# Patient Record
Sex: FEMALE | Race: WHITE | Employment: FULL TIME | URBAN - METROPOLITAN AREA
[De-identification: names, ages, dates, MRNs, and addresses within clinical notes are randomized per-mention and may not be internally consistent; named-entity substitution may affect disease eponyms.]

---

## 2018-06-27 ENCOUNTER — OFFICE VISIT (OUTPATIENT)
Dept: OPHTHALMOLOGY | Facility: CLINIC | Age: 33
End: 2018-06-27
Payer: COMMERCIAL

## 2018-06-27 DIAGNOSIS — H10.13 ALLERGIC CONJUNCTIVITIS OF BOTH EYES: Primary | ICD-10-CM

## 2018-06-27 DIAGNOSIS — H52.13 MYOPIA OF BOTH EYES: ICD-10-CM

## 2018-06-27 RX ORDER — ACETAMINOPHEN 160 MG
2000 TABLET,DISINTEGRATING ORAL
COMMUNITY
Start: 2018-06-12

## 2018-06-27 RX ORDER — ONDANSETRON 4 MG/1
8 TABLET, ORALLY DISINTEGRATING ORAL
COMMUNITY
Start: 2018-06-12 | End: 2024-07-09

## 2018-06-27 RX ORDER — BUPROPION HYDROCHLORIDE 150 MG/1
300 TABLET ORAL
COMMUNITY
Start: 2018-06-12 | End: 2024-07-09

## 2018-06-27 RX ORDER — ISOTRETINOIN 30 MG/1
CAPSULE, LIQUID FILLED ORAL
Refills: 0 | COMMUNITY
Start: 2018-06-06 | End: 2024-07-09

## 2018-06-27 RX ORDER — VALACYCLOVIR HYDROCHLORIDE 1 G/1
1000 TABLET, FILM COATED ORAL
COMMUNITY
Start: 2018-06-12 | End: 2024-07-09

## 2018-06-27 RX ORDER — OLOPATADINE HYDROCHLORIDE 1 MG/ML
1 SOLUTION/ DROPS OPHTHALMIC 2 TIMES DAILY
Qty: 1 BOTTLE | Refills: 3 | Status: SHIPPED | OUTPATIENT
Start: 2018-06-27 | End: 2024-07-09

## 2018-06-27 ASSESSMENT — REFRACTION_CURRENTRX
OS_BASECURVE: 8.5
OS_AXIS: 180
OD_SPHERE: -2.50
OD_AXIS: 180
OD_DIAMETER: 14.5
OD_CYLINDER: -1.75
OS_DIAMETER: 14.5
OS_SPHERE: -2.25
OD_BASECURVE: 8.5
OS_CYLINDER: -1.75

## 2018-06-27 ASSESSMENT — REFRACTION_MANIFEST
OS_SPHERE: -4.00
OD_SPHERE: -4.25
OS_CYLINDER: +2.00
OD_CYLINDER: +2.00
OD_AXIS: 090
OS_AXIS: 100

## 2018-06-27 ASSESSMENT — VISUAL ACUITY
CORRECTION_TYPE: GLASSES
METHOD: SNELLEN - LINEAR
OS_CC: 20/20
OD_CC: 20/20

## 2018-06-27 ASSESSMENT — CONF VISUAL FIELD
OS_NORMAL: 1
OD_NORMAL: 1

## 2018-06-27 ASSESSMENT — CUP TO DISC RATIO
OD_RATIO: 0.4
OS_RATIO: 0.3

## 2018-06-27 ASSESSMENT — REFRACTION_WEARINGRX
OS_AXIS: 100
OS_CYLINDER: +2.00
OD_SPHERE: -4.25
SPECS_TYPE: SVL
OD_AXIS: 092
OD_CYLINDER: +2.00
OS_SPHERE: -4.25

## 2018-06-27 ASSESSMENT — SLIT LAMP EXAM - LIDS
COMMENTS: NORMAL
COMMENTS: NORMAL

## 2018-06-27 ASSESSMENT — TONOMETRY
IOP_METHOD: TONOPEN
OS_IOP_MMHG: 17
OD_IOP_MMHG: 16

## 2018-06-27 ASSESSMENT — EXTERNAL EXAM - LEFT EYE: OS_EXAM: NORMAL

## 2018-06-27 ASSESSMENT — EXTERNAL EXAM - RIGHT EYE: OD_EXAM: NORMAL

## 2018-06-27 NOTE — PROGRESS NOTES
Assessment/Plan  (H10.13) Allergic conjunctivitis of both eyes  (primary encounter diagnosis)  Comment: Symptomatic  Plan: olopatadine (PATANOL) 0.1 % ophthalmic solution         Educated patient on clinical findings. Prescribed Patanol bid OU x 1 month. Monitor at contact lens dispense visit.   The patient is taking Accutane, which may be causing dryness. If symptoms not improved after treating allergies, consider further treatment.    (H52.13) Myopia of both eyes  Comment: Myopic astigmatism OU  Plan: HC CONTACT LENS FITTING COSMETIC LVL 1 (16937.011), REFRACTION [04803]         Educated patient on condition and clinical findings. Dispensed spectacle prescription for full time wear. Educated patient on possibility of adaptation period, if symptoms do not improve return to clinic for further testing.   Current contact lens vision and fit is acceptable, but given infrequent wear, daily lenses are more practical. 2 brands ordered. Patient to be scheduled for dispense visit upon receipt of lenses.    Contact Lens Billing  V-Code:  - Soft toric     CL Fitting Fee: $75    These are for cosmetic contact lenses.    Encounter Diagnoses   Name Primary?     Allergic conjunctivitis of both eyes Yes     Myopia of both eyes           Complete documentation of historical and exam elements from today's encounter can  be found in the full encounter summary report (not reduplicated in this progress  note). I personally obtained the chief complaint(s) and history of present illness. I  confirmed and edited as necessary the review of systems, past medical/surgical  history, family history, social history, and examination findings as documented by  others; and I examined the patient myself. I personally reviewed the relevant tests,  images, and reports as documented above. I formulated and edited as necessary the  assessment and plan and discussed the findings and management plan with the  patient and family.    Denny Whelan,  OD, FAAO

## 2018-06-27 NOTE — NURSING NOTE
Chief Complaints and History of Present Illnesses   Patient presents with     COMPREHENSIVE EYE EXAM     HPI    Affected eye(s):  Both   Symptoms:     No blurred vision   No floaters   No flashes   No foreign body sensation   No tearing   Dryness (Comment: patient denies eye gtts for relief)   No itching   No burning         Do you have eye pain now?:  Yes   Location:  OD   Pain Level:  No Pain (1)   Pain Duration:  2 weeks   Pain Frequency:  Intermittent   Pain Characteristics:  Aching      Comments:    Patient is here today for CTL exam today and gls, notes she has been having an achey/pressure feeling in the RE intermittently for about 2-3 weeks now    Tiara Bonilla June 27, 2018 1:11 PM

## 2018-06-27 NOTE — MR AVS SNAPSHOT
After Visit Summary   6/27/2018    Kemi Mckoy    MRN: [id number]           Patient Information     Date Of Birth          1985        Visit Information        Provider Department      6/27/2018 1:00 PM Denny Whelan OD  [facility]        Today's Diagnoses     Allergic conjunctivitis of both eyes    -  1    Myopia of both eyes           Follow-ups after your visit        Who to contact     Please call your clinic at [phone] to:    Ask questions about your health    Make or cancel appointments    Discuss your medicines    Learn about your test results    Speak to your doctor            Additional Information About Your Visit        Care EveryWhere ID     This is your Care EveryWhere ID. This could be used by other organizations to access your [place] medical records  [id number]         Blood Pressure from Last 3 Encounters:   No data found for BP    Weight from Last 3 Encounters:   No data found for Wt              We Performed the Following     HC CONTACT LENS FITTING COSMETIC LVL 1 (63849.011)     REFRACTION [54619]          Today's Medication Changes          These changes are accurate as of 6/27/18 11:59 PM.  If you have any questions, ask your nurse or doctor.               Start taking these medicines.        Dose/Directions    olopatadine 0.1 % ophthalmic solution   Commonly known as:  PATANOL   Used for:  Allergic conjunctivitis of both eyes   Started by:  Denny Whelan, ALIA        Dose:  1 drop   Place 1 drop into both eyes 2 times daily   Quantity:  1 Bottle   Refills:  3            Where to get your medicines      These medications were sent to [facility] [address] [address] [address] AT [facility] AND [address] [address] [address] [facility] [address]     Phone:  [phone]     olopatadine 0.1 % ophthalmic solution                Primary Care Provider Fax #    [facility] [phone]       [address] [place] [address]  [facility] [address]        Equal Access to Services     [facility] : Hadii aad ku hadjagdishlou Geraldoali, watomasda luqadaha, qashardata kaalmamaury tripp, richard reyes. So [facility] [phone].    ATENCIÓN: Si habla español, tiene a cadena disposición servicios gratuitos de asistencia lingüística. Fawname al [phone].    We comply with applicable federal civil rights laws and [place] laws. We do not discriminate on the basis of race, color, national origin, age, disability, sex, sexual orientation, or gender identity.            Thank you!     Thank you for choosing [facility] OPHTHALMOLOGY  for your care. Our goal is always to provide you with excellent care. Hearing back from our patients is one way we can continue to improve our services. Please take a few minutes to complete the written survey that you may receive in the mail after your visit with us. Thank you!             Your Updated Medication List - Protect others around you: Learn how to safely use, store and throw away your medicines at www.disposemymeds.org.          This list is accurate as of 6/27/18 11:59 PM.  Always use your most recent med list.                   Brand Name Dispense Instructions for use Diagnosis    ampicillin 500 MG capsule    PRINCIPEN    60 capsule    One h.s. And one in AM for acne    Acne       buPROPion 150 MG 24 hr tablet    WELLBUTRIN XL     Take 300 mg by mouth        CLARAVIS 30 MG Caps   Generic drug:  ISOtretinoin      TK 2 CS PO QD WITH HIGH FAT MEAL        doxycycline 100 MG capsule    VIBRAMYCIN     Take by mouth 2 times daily        * levonorgestrel 20 MCG/24HR IUD    MIRENA     1 each by Intrauterine route once        * levonorgestrel 20 MCG/24HR IUD    MIRENA          olopatadine 0.1 % ophthalmic solution    PATANOL    1 Bottle    Place 1 drop into both eyes 2 times daily    Allergic conjunctivitis of both eyes       ondansetron 4 MG ODT tab    ZOFRAN-ODT     Take 8 mg by mouth        tretinoin  (emollient) 0.05 % Crea cream      Externally apply topically At Bedtime        * VALTREX 500 MG tablet   Generic drug:  valACYclovir      Take 500 mg by mouth as needed.        * valACYclovir 1000 mg tablet    VALTREX     Take 1,000 mg by mouth        vitamin D3 2000 units Caps      Take 2,000 Units by mouth        * Notice:  This list has 4 medication(s) that are the same as other medications prescribed for you. Read the directions carefully, and ask your doctor or other care provider to review them with you.

## 2018-07-10 ENCOUNTER — TELEPHONE (OUTPATIENT)
Dept: OPHTHALMOLOGY | Facility: CLINIC | Age: 33
End: 2018-07-10

## 2018-07-17 ENCOUNTER — TELEPHONE (OUTPATIENT)
Dept: OPHTHALMOLOGY | Facility: CLINIC | Age: 33
End: 2018-07-17

## 2018-08-10 ENCOUNTER — OFFICE VISIT (OUTPATIENT)
Dept: OPHTHALMOLOGY | Facility: CLINIC | Age: 33
End: 2018-08-10
Payer: COMMERCIAL

## 2018-08-10 DIAGNOSIS — H52.13 MYOPIA OF BOTH EYES: Primary | ICD-10-CM

## 2018-08-10 ASSESSMENT — REFRACTION_CURRENTRX
OS_BRAND: DAILIES AQUACOMFORT PLUS
OS_SPHERE: -2.00
OD_BRAND: DAILIES AQUACOMFORT PLUS
OD_CYLINDER: -1.75
OS_AXIS: 010
OD_AXIS: 180
OD_SPHERE: -2.25
OS_BASECURVE: 8.8
OS_DIAMETER: 14.4
OD_DIAMETER: 14.4
OD_BASECURVE: 8.8
OS_CYLINDER: -1.75

## 2018-08-10 NOTE — MR AVS SNAPSHOT
After Visit Summary   8/10/2018    Kemi Mckoy    MRN: [id number]           Patient Information     Date Of Birth          1985        Visit Information        Provider Department      8/10/2018 8:40 AM Denny Whelan, ALIA [facility] Ophthalmology        Today's Diagnoses     Myopia of both eyes    -  1       Follow-ups after your visit        Follow-up notes from your care team     Return in about 1 year (around 8/10/2019) for Comprehensive Eye Exam.      Who to contact     Please call your clinic at [phone] to:    Ask questions about your health    Make or cancel appointments    Discuss your medicines    Learn about your test results    Speak to your doctor            Additional Information About Your Visit        Care EveryWhere ID     This is your Care EveryWhere ID. This could be used by other organizations to access your [place] medical records  [id number]         Blood Pressure from Last 3 Encounters:   No data found for BP    Weight from Last 3 Encounters:   No data found for Wt              Today, you had the following     No orders found for display       Primary Care Provider Fax #    [facility] [phone]       [address] [place] [address]        Equal Access to Services     [facility]: Hadii aad ku hadasho Soomaali, waaxda luqadaha, qaybta kaalmada adeegyada, waxay gustavoin haylele flanagan . So [facility] [phone].    ATENCIÓN: Si habla español, tiene a cadena disposición servicios gratuitos de asistencia lingüística. Llame al [phone].    We comply with applicable federal civil rights laws and [place] laws. We do not discriminate on the basis of race, color, national origin, age, disability, sex, sexual orientation, or gender identity.            Thank you!     Thank you for choosing [facility] OPHTHALMOLOGY  for your care. Our goal is always to provide you with excellent care. Hearing back from our patients is one way we can continue  to improve our services. Please take a few minutes to complete the written survey that you may receive in the mail after your visit with us. Thank you!             Your Updated Medication List - Protect others around you: Learn how to safely use, store and throw away your medicines at www.disposemymeds.org.          This list is accurate as of 8/10/18 10:29 AM.  Always use your most recent med list.                   Brand Name Dispense Instructions for use Diagnosis    ampicillin 500 MG capsule    PRINCIPEN    60 capsule    One h.s. And one in AM for acne    Acne       buPROPion 150 MG 24 hr tablet    WELLBUTRIN XL     Take 300 mg by mouth        CLARAVIS 30 MG Caps   Generic drug:  ISOtretinoin      TK 2 CS PO QD WITH HIGH FAT MEAL        doxycycline 100 MG capsule    VIBRAMYCIN     Take by mouth 2 times daily        * levonorgestrel 20 MCG/24HR IUD    MIRENA     1 each by Intrauterine route once        * levonorgestrel 20 MCG/24HR IUD    MIRENA          olopatadine 0.1 % ophthalmic solution    PATANOL    1 Bottle    Place 1 drop into both eyes 2 times daily    Allergic conjunctivitis of both eyes       ondansetron 4 MG ODT tab    ZOFRAN-ODT     Take 8 mg by mouth        tretinoin (emollient) 0.05 % Crea cream      Externally apply topically At Bedtime        * VALTREX 500 MG tablet   Generic drug:  valACYclovir      Take 500 mg by mouth as needed.        * valACYclovir 1000 mg tablet    VALTREX     Take 1,000 mg by mouth        vitamin D3 2000 units Caps      Take 2,000 Units by mouth        * Notice:  This list has 4 medication(s) that are the same as other medications prescribed for you. Read the directions carefully, and ask your doctor or other care provider to review them with you.

## 2018-08-14 ENCOUNTER — HEALTH MAINTENANCE LETTER (OUTPATIENT)
Age: 33
End: 2018-08-14

## 2019-09-04 ENCOUNTER — OFFICE VISIT (OUTPATIENT)
Dept: OPHTHALMOLOGY | Facility: CLINIC | Age: 34
End: 2019-09-04
Payer: COMMERCIAL

## 2019-09-04 DIAGNOSIS — H10.13 ALLERGIC CONJUNCTIVITIS OF BOTH EYES: ICD-10-CM

## 2019-09-04 DIAGNOSIS — H52.13 MYOPIA OF BOTH EYES: Primary | ICD-10-CM

## 2019-09-04 ASSESSMENT — VISUAL ACUITY
CORRECTION_TYPE: GLASSES
OS_CC: 20/20
OD_CC: J1+
METHOD: SNELLEN - LINEAR
OD_CC: 20/20
OS_CC: J1+

## 2019-09-04 ASSESSMENT — TONOMETRY
OD_IOP_MMHG: 15
IOP_METHOD: ICARE
OS_IOP_MMHG: 15

## 2019-09-04 ASSESSMENT — CUP TO DISC RATIO
OD_RATIO: 0.4
OS_RATIO: 0.3

## 2019-09-04 ASSESSMENT — CONF VISUAL FIELD
OD_NORMAL: 1
OS_NORMAL: 1

## 2019-09-04 ASSESSMENT — SLIT LAMP EXAM - LIDS
COMMENTS: NORMAL
COMMENTS: NORMAL

## 2019-09-04 ASSESSMENT — EXTERNAL EXAM - LEFT EYE: OS_EXAM: NORMAL

## 2019-09-04 ASSESSMENT — EXTERNAL EXAM - RIGHT EYE: OD_EXAM: NORMAL

## 2019-09-04 ASSESSMENT — REFRACTION_MANIFEST
OD_SPHERE: -3.50
OD_AXIS: 090
OS_CYLINDER: +2.00
OS_SPHERE: -3.75
OS_AXIS: 097
OD_CYLINDER: +1.50

## 2019-09-04 NOTE — PROGRESS NOTES
History  HPI     Annual Eye Exam     In both eyes.  Charactertized as  blurred vision.  Severity is mild.  Context:  distance vision.  Associated symptoms include Negative for glare, double vision, flashes and floaters.  Treatments tried include glasses.  Pain was noted as 0/10.              Comments     The patient had a migraine the other day, atypical for her. In the past, more frequent headaches have indicated a change in her glasses, so she wanted to check.  Noticing she is squinting more.  Wearing contacts 4x/ month.           Last edited by Denny Whelan, OD on 9/4/2019  5:53 PM. (History)          Assessment/Plan  (H52.13) Myopia of both eyes  (primary encounter diagnosis)  Comment: Myopia both eyes, happy in current contacts  Plan: REFRACTION         Educated patient on condition and clinical findings. Dispensed spectacle prescription for full time wear. Educated patient on possibility of adaptation period, if symptoms do not improve return to clinic for further testing.   Contact lenses not assessed at this visit as the prescription is valid for 1 more year and patient is happy with lenses.    (H10.13) Allergic conjunctivitis of both eyes  Comment: Asymptomatic  Plan:  No treatment indicated at this time. Monitor as needed.    Return to clinic in 1 year for comprehensive eye exam.    Complete documentation of historical and exam elements from today's encounter can  be found in the full encounter summary report (not reduplicated in this progress  note). I personally obtained the chief complaint(s) and history of present illness. I  confirmed and edited as necessary the review of systems, past medical/surgical  history, family history, social history, and examination findings as documented by  others; and I examined the patient myself. I personally reviewed the relevant tests,  images, and reports as documented above. I formulated and edited as necessary the  assessment and plan and discussed the findings  and management plan with the  patient and family.    Denny Whelan OD, FAAO

## 2019-09-04 NOTE — NURSING NOTE
Chief Complaints and History of Present Illnesses   Patient presents with     Annual Eye Exam     Chief Complaint(s) and History of Present Illness(es)     Annual Eye Exam     Laterality: both eyes    Quality: blurred vision    Severity: mild    Context: distance vision    Associated symptoms: Negative for glare, double vision, flashes and floaters    Treatments tried: glasses    Pain scale: 0/10              Comments     The patient had a migraine the other day, atypical for her. In the past, more frequent headaches have indicated a change in her glasses, so she wanted to check.  Noticing she is squinting more.  Wearing contacts 4x/ month.

## 2020-03-02 ENCOUNTER — HEALTH MAINTENANCE LETTER (OUTPATIENT)
Age: 35
End: 2020-03-02

## 2020-12-20 ENCOUNTER — HEALTH MAINTENANCE LETTER (OUTPATIENT)
Age: 35
End: 2020-12-20

## 2021-04-18 ENCOUNTER — HEALTH MAINTENANCE LETTER (OUTPATIENT)
Age: 36
End: 2021-04-18

## 2021-06-01 ENCOUNTER — OFFICE VISIT (OUTPATIENT)
Dept: OPHTHALMOLOGY | Facility: CLINIC | Age: 36
End: 2021-06-01
Payer: COMMERCIAL

## 2021-06-01 DIAGNOSIS — H52.13 MYOPIA OF BOTH EYES: Primary | ICD-10-CM

## 2021-06-01 DIAGNOSIS — H40.003 GLAUCOMA SUSPECT OF BOTH EYES: ICD-10-CM

## 2021-06-01 PROCEDURE — 92014 COMPRE OPH EXAM EST PT 1/>: CPT | Performed by: OPTOMETRIST

## 2021-06-01 PROCEDURE — 92310 CONTACT LENS FITTING OU: CPT | Performed by: OPTOMETRIST

## 2021-06-01 PROCEDURE — 92133 CPTRZD OPH DX IMG PST SGM ON: CPT | Performed by: OPTOMETRIST

## 2021-06-01 PROCEDURE — 92015 DETERMINE REFRACTIVE STATE: CPT | Performed by: OPTOMETRIST

## 2021-06-01 ASSESSMENT — REFRACTION_CURRENTRX
OS_SPHERE: -2.50
OS_CYLINDER: -1.25
OS_DIAMETER: 14.3
OS_BASECURVE: 8.5
OS_AXIS: 010
OD_CYLINDER: -1.25
OS_BRAND: ACUVUE OASYS 1-DAY TORIC
OD_AXIS: 180
OD_BASECURVE: 8.5
OD_BRAND: ACUVUE OASYS 1-DAY TORIC
OD_DIAMETER: 14.3
OD_SPHERE: -2.50

## 2021-06-01 ASSESSMENT — REFRACTION_MANIFEST
OD_CYLINDER: +0.25
OS_SPHERE: PLANO
OD_SPHERE: -0.25
OD_SPHERE: -4.00
OS_AXIS: 095
OD_AXIS: 085
OS_CYLINDER: +0.50
OS_SPHERE: -4.00
OD_CYLINDER: +1.75
OS_AXIS: 090
OS_CYLINDER: +1.75
OD_AXIS: 090

## 2021-06-01 ASSESSMENT — SLIT LAMP EXAM - LIDS
COMMENTS: NORMAL
COMMENTS: NORMAL

## 2021-06-01 ASSESSMENT — REFRACTION_WEARINGRX
OS_AXIS: 097
OS_CYLINDER: +2.00
OD_SPHERE: -3.50
OS_SPHERE: -4.00
OD_CYLINDER: +1.50
SPECS_TYPE: SVL
OD_AXIS: 090

## 2021-06-01 ASSESSMENT — CONF VISUAL FIELD
OD_NORMAL: 1
METHOD: COUNTING FINGERS
OS_NORMAL: 1

## 2021-06-01 ASSESSMENT — VISUAL ACUITY
OS_CC: 20/20
METHOD: SNELLEN - LINEAR
OD_CC: 20/20
OD_CC: 20/20
OS_CC: 20/20

## 2021-06-01 ASSESSMENT — TONOMETRY
OD_IOP_MMHG: 16
IOP_METHOD: TONOPEN
OS_IOP_MMHG: 15

## 2021-06-01 ASSESSMENT — EXTERNAL EXAM - LEFT EYE: OS_EXAM: NORMAL

## 2021-06-01 ASSESSMENT — CUP TO DISC RATIO
OS_RATIO: 0.2
OD_RATIO: 0.4

## 2021-06-01 ASSESSMENT — EXTERNAL EXAM - RIGHT EYE: OD_EXAM: NORMAL

## 2021-06-01 NOTE — PROGRESS NOTES
History  HPI     Patient here for annual comprehensive eye exam. Patient finds herself squinting more through glasses but does not report blurry vision.Patient denies eyestrain or double vision. Patient wears glasses > contact lenses. Patient wears CLs for events only. Patient has not had allergic symptoms this year.     Last edited by Demarcus Conley on 6/1/2021  7:08 AM. (History)          Assessment/Plan  (H52.13) Myopia of both eyes  (primary encounter diagnosis)  Comment: Myopia both eyes   Plan: REFRACTION, AL CONTACT LENS FITTING COSMETIC LVL 1 - ADULT         Educated patient on condition and clinical findings. Dispensed spectacle prescription for full time wear. Monitor annually.   Dispensed trial lenses and finalized contact lens prescription for 2 years.    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 Monitor annually.    Return to clinic in 1 year for comprehensive eye exam.    Contact Lens Billing  V-Code:  - Soft toric  Final Contact Lens Rx       Brand Base Curve Diameter Sphere Cylinder Axis    Right Acuvue Oasys 1-Day toric 8.5 14.3 -2.50 -1.25 180    Left Acuvue Oasys 1-Day toric 8.5 14.3 -2.50 -1.25 010    Expiration Date: 6/2/2023    Replacement: Daily         CL Fitting Fee: $75    These are for cosmetic contact lenses.    Encounter Diagnoses   Name Primary?     Myopia of both eyes Yes     Glaucoma suspect of both eyes       Complete documentation of historical and exam elements from today's encounter can  be found in the full encounter summary report (not reduplicated in this progress  note). I personally obtained the chief complaint(s) and history of present illness. I  confirmed and edited as necessary the review of systems, past medical/surgical  history, family history, social history, and  examination findings as documented by  others; and I examined the patient myself. I personally reviewed the relevant tests,  images, and reports as documented above. I formulated and edited as necessary the  assessment and plan and discussed the findings and management plan with the  patient and family.    Denny Whelan OD, FAAO

## 2021-10-03 ENCOUNTER — HEALTH MAINTENANCE LETTER (OUTPATIENT)
Age: 36
End: 2021-10-03

## 2022-05-15 ENCOUNTER — HEALTH MAINTENANCE LETTER (OUTPATIENT)
Age: 37
End: 2022-05-15

## 2022-09-04 ENCOUNTER — HEALTH MAINTENANCE LETTER (OUTPATIENT)
Age: 37
End: 2022-09-04

## 2023-06-03 ENCOUNTER — HEALTH MAINTENANCE LETTER (OUTPATIENT)
Age: 38
End: 2023-06-03

## 2024-04-28 ENCOUNTER — HEALTH MAINTENANCE LETTER (OUTPATIENT)
Age: 39
End: 2024-04-28

## 2024-07-02 ENCOUNTER — OFFICE VISIT (OUTPATIENT)
Dept: OPTOMETRY | Facility: CLINIC | Age: 39
End: 2024-07-02
Payer: MEDICAID

## 2024-07-02 DIAGNOSIS — H02.889 MEIBOMIAN GLAND DYSFUNCTION: Primary | ICD-10-CM

## 2024-07-02 DIAGNOSIS — H04.129 DRY EYE: ICD-10-CM

## 2024-07-02 PROCEDURE — 92002 INTRM OPH EXAM NEW PATIENT: CPT | Performed by: OPTOMETRIST

## 2024-07-02 ASSESSMENT — CUP TO DISC RATIO
OS_RATIO: 0.2
OD_RATIO: 0.4

## 2024-07-02 ASSESSMENT — VISUAL ACUITY
METHOD: SNELLEN - LINEAR
CORRECTION_TYPE: GLASSES
OS_CC: 20/20

## 2024-07-02 ASSESSMENT — SLIT LAMP EXAM - LIDS
COMMENTS: MEIBOMIAN GLAND DYSFUNCTION
COMMENTS: MEIBOMIAN GLAND DYSFUNCTION

## 2024-07-02 ASSESSMENT — EXTERNAL EXAM - LEFT EYE: OS_EXAM: ROSACEA

## 2024-07-02 ASSESSMENT — EXTERNAL EXAM - RIGHT EYE: OD_EXAM: ROSACEA

## 2024-07-02 NOTE — PROGRESS NOTES
Chief Complaint   Patient presents with    Eye Pain Left Eye   Patient presents with a mildly irritated left eye. She says it feels like a pressure is in her eye like it is too big for the orbit. She said is was very red, nasally and she went to see an eye doctor. He told her it was conjunctivitis and gave her prescription drops she was on for 10 days. She states the redness went away but not the feeling.  She has not used any other kind of drops to help with symptoms    Do you wear contact lenses? Yes - rarely and has not for a really long time           Darling Reinoso - Optometric Assistant      See Review Of Systems       Medical, surgical and family histories reviewed and updated 7/2/2024.       Not on any medications  History of rosacea , ssri, retin A , doxy allergic conjunctivitis     OBJECTIVE: See Ophthalmology exam    ASSESSMENT:    ICD-10-CM    1. Meibomian gland dysfunction  H02.889       2. Dry eye  H04.129        Sounds like she had episcleritis but resolved on maxitrol     PLAN:  Artificial tears  three times daily   Warm compresses   Thais Lua OD

## 2024-07-02 NOTE — LETTER
7/2/2024      Kemi Mckoy  [address]  [place] [address]      Dear Colleague,    Thank you for referring your patient, Kemi Mckoy, to the [facility]. Please see a copy of my visit note below.    Chief Complaint   Patient presents with     Eye Pain Left Eye   Patient presents with a mildly irritated left eye. She says it feels like a pressure is in her eye like it is too big for the orbit. She said is was very red, nasally and she went to see an eye doctor. He told her it was conjunctivitis and gave her prescription drops she was on for 10 days. She states the redness went away but not the feeling.  She has not used any other kind of drops to help with symptoms    Do you wear contact lenses? Yes - rarely and has not for a really long time           Darling Reinoso - Optometric Assistant      See Review Of Systems       Medical, surgical and family histories reviewed and updated 7/2/2024.       Not on any medications  History of rosacea , ssri, retin A , doxy allergic conjunctivitis     OBJECTIVE: See Ophthalmology exam    ASSESSMENT:    ICD-10-CM    1. Meibomian gland dysfunction  H02.889       2. Dry eye  H04.129        Sounds like she had episcleritis but resolved on maxitrol     PLAN:  Artificial tears  three times daily   Warm compresses   Thais Lua OD     Again, thank you for allowing me to participate in the care of your patient.        Sincerely,        Thais Lua, OD

## 2024-07-07 ENCOUNTER — HEALTH MAINTENANCE LETTER (OUTPATIENT)
Age: 39
End: 2024-07-07

## 2024-07-09 ENCOUNTER — MYC MEDICAL ADVICE (OUTPATIENT)
Dept: FAMILY MEDICINE | Facility: CLINIC | Age: 39
End: 2024-07-09

## 2024-07-09 ENCOUNTER — VIRTUAL VISIT (OUTPATIENT)
Dept: FAMILY MEDICINE | Facility: CLINIC | Age: 39
End: 2024-07-09
Payer: MEDICAID

## 2024-07-09 DIAGNOSIS — N30.10 INTERSTITIAL CYSTITIS: ICD-10-CM

## 2024-07-09 DIAGNOSIS — B00.1 HERPES LABIALIS: ICD-10-CM

## 2024-07-09 DIAGNOSIS — L71.9 ROSACEA: ICD-10-CM

## 2024-07-09 DIAGNOSIS — R11.15 CYCLIC VOMITING SYNDROME: ICD-10-CM

## 2024-07-09 DIAGNOSIS — L20.82 FLEXURAL ECZEMA: ICD-10-CM

## 2024-07-09 DIAGNOSIS — Z76.89 ESTABLISHING CARE WITH NEW DOCTOR, ENCOUNTER FOR: Primary | ICD-10-CM

## 2024-07-09 DIAGNOSIS — R11.0 NAUSEA: ICD-10-CM

## 2024-07-09 PROCEDURE — 99204 OFFICE O/P NEW MOD 45 MIN: CPT | Mod: 95 | Performed by: FAMILY MEDICINE

## 2024-07-09 RX ORDER — AMITRIPTYLINE HYDROCHLORIDE 10 MG/1
10 TABLET ORAL AT BEDTIME
Qty: 90 TABLET | Refills: 1 | Status: SHIPPED | OUTPATIENT
Start: 2024-07-09

## 2024-07-09 RX ORDER — CYCLOBENZAPRINE HCL 5 MG
5 TABLET ORAL 3 TIMES DAILY PRN
Qty: 60 TABLET | Refills: 1 | Status: SHIPPED | OUTPATIENT
Start: 2024-07-09

## 2024-07-09 RX ORDER — VALACYCLOVIR HYDROCHLORIDE 1 G/1
2000 TABLET, FILM COATED ORAL 2 TIMES DAILY
Qty: 4 TABLET | Refills: 3 | Status: SHIPPED | OUTPATIENT
Start: 2024-07-09 | End: 2024-08-19

## 2024-07-09 RX ORDER — CIMETIDINE 300 MG
300 TABLET ORAL 3 TIMES DAILY
Qty: 270 TABLET | Refills: 0 | Status: SHIPPED | OUTPATIENT
Start: 2024-07-09

## 2024-07-09 RX ORDER — HYDROXYZINE HYDROCHLORIDE 50 MG/1
50 TABLET, FILM COATED ORAL 2 TIMES DAILY
Qty: 180 TABLET | Refills: 1 | Status: SHIPPED | OUTPATIENT
Start: 2024-07-09

## 2024-07-09 RX ORDER — IVERMECTIN 10 MG/G
CREAM TOPICAL
Qty: 45 G | Refills: 1 | Status: SHIPPED | OUTPATIENT
Start: 2024-07-09

## 2024-07-09 RX ORDER — CLOBETASOL PROPIONATE 0.5 MG/G
CREAM TOPICAL 2 TIMES DAILY
Qty: 45 G | Refills: 1 | Status: SHIPPED | OUTPATIENT
Start: 2024-07-09

## 2024-07-09 RX ORDER — DULOXETIN HYDROCHLORIDE 30 MG/1
30 CAPSULE, DELAYED RELEASE ORAL 2 TIMES DAILY
Qty: 90 CAPSULE | Refills: 1 | Status: SHIPPED | OUTPATIENT
Start: 2024-07-09

## 2024-07-09 RX ORDER — ONDANSETRON 4 MG/1
4 TABLET, ORALLY DISINTEGRATING ORAL EVERY 8 HOURS PRN
Qty: 20 TABLET | Refills: 2 | Status: SHIPPED | OUTPATIENT
Start: 2024-07-09

## 2024-07-09 NOTE — PROGRESS NOTES
Kemi is a 39 year old who is being evaluated via a billable video visit.    How would you like to obtain your AVS? MyChart  If the video visit is dropped, the invitation should be resent by: Text to cell phone: [phone]  Will anyone else be joining your video visit? No      Assessment & Plan     Establishing care with new doctor, encounter for  -Follow-up for annual preventative health visit    Interstitial cystitis  -Followed with uro/gyn, will find new provider when insurance is active  - conjugated estrogens (PREMARIN) 0.625 MG/GM vaginal cream  Dispense: 30 g; Refill: 1  - DULoxetine (CYMBALTA) 30 MG capsule  Dispense: 90 capsule; Refill: 1  - cimetidine (TAGAMET) 300 MG tablet  Dispense: 270 tablet; Refill: 0  - hydrOXYzine HCl (ATARAX) 50 MG tablet  Dispense: 180 tablet; Refill: 1  - amitriptyline (ELAVIL) 10 MG tablet  Dispense: 90 tablet; Refill: 1  - cyclobenzaprine (FLEXERIL) 5 MG tablet  Dispense: 60 tablet; Refill: 1    Herpes labialis  -Flares once every 3 months, Valtrex for flares.  - valACYclovir (VALTREX) 1000 mg tablet  Dispense: 4 tablet; Refill: 3    Cyclic vomiting syndrome  Nausea  -Stable  - ondansetron (ZOFRAN ODT) 4 MG ODT tab  Dispense: 20 tablet; Refill: 2    Flexural eczema  -Steroid for flares  - clobetasol propionate (TEMOVATE) 0.05 % external cream  Dispense: 45 g; Refill: 1    Rosacea  - ivermectin (SOOLANTRA) 1 % cream  Dispense: 45 g; Refill: 1                  Subjective   Kemi is a 39 year old, presenting for the following health issues:  Establish Care and Refill Request        7/9/2024     2:50 PM   Additional Questions   Roomed by Yolanda DEGROOT     Video Start Time: 4:56 PM    History of Present Illness       Reason for visit:  Est Care and med refills      Moved back to [facility].  Wanting to establish care.  Needs meds refilled.    Hx of interstitial cystitis. Was following with uro/gyn. Plans to see new provider when her insurance kicks in. Takes amitriptyline,  "cimetidine, hydroxyzine, flexeril, Cymbalta, premarin for interstitial cystitis.     Cold sore: Out break about once every 3 months. Uses Valtrex for flares.    Rosacea: uses ivermectin cram daily.     Cyclic vomiting syndrome: uses zofran prn.    Eczema: uses clobetasol for flares.                 Objective    Vitals - Patient Reported  Weight (Patient Reported): 74.8 kg (165 lb)  Height (Patient Reported): 167.6 cm (5' 6\")  BMI (Based on Pt Reported Ht/Wt): 26.63  Pain Score: No Pain (0)        Physical Exam   GENERAL: alert and no distress  EYES: Eyes grossly normal to inspection.  No discharge or erythema, or obvious scleral/conjunctival abnormalities.  RESP: No audible wheeze, cough, or visible cyanosis.    SKIN: Visible skin clear. No significant rash, abnormal pigmentation or lesions.  NEURO: Cranial nerves grossly intact.  Mentation and speech appropriate for age.  PSYCH: Appropriate affect, tone, and pace of words          Video-Visit Details    Type of service:  Video Visit   Video End Time:5:06 PM  Originating Location (pt. Location): Home    Distant Location (provider location):  On-site  Platform used for Video Visit: Jodee  Signed Electronically by: Alf Obrien DO    "

## 2024-07-12 ENCOUNTER — TELEPHONE (OUTPATIENT)
Dept: FAMILY MEDICINE | Facility: CLINIC | Age: 39
End: 2024-07-12
Payer: MEDICAID

## 2024-07-12 NOTE — TELEPHONE ENCOUNTER
Prior Authorization Retail Medication Request    Medication/Dose: ivermectin (SOOLANTRA) 1 % cream   Diagnosis and ICD code (if different than what is on RX):  L71.9     Insurance   Primary: MEDICAID [place]   Insurance ID:  [id number]     Pharmacy Information (if different than what is on RX)  Name:    [id number] - [place], [place] - [address] [place] [address] N AT [facility] [address]     Phone:  [phone]   Fax:[phone]

## 2024-07-17 NOTE — TELEPHONE ENCOUNTER
PRIOR AUTHORIZATION DENIED    Medication: IVERMECTIN 1 % EX CREA  Insurance Company: [place] Medicaid ([facility]) - Phone [phone] Fax [phone]  Denial Date: 7/17/2024  Denial Reason(s): Needs to try/fail formulary alternatives      Appeal Information:     Patient Notified: No

## 2024-07-17 NOTE — TELEPHONE ENCOUNTER
PA Initiation    Medication: IVERMECTIN 1 % EX CREA  Insurance Company: [place] Medicaid ([facility]) - Phone [phone] Fax [phone]  Pharmacy Filling the Rx: [id number] - [place], [place] - [address] [place] [address] N AT [facility] [address]  Filling Pharmacy Phone: [phone]  Filling Pharmacy Fax:    Start Date: 7/17/2024

## 2024-08-19 ENCOUNTER — OFFICE VISIT (OUTPATIENT)
Dept: FAMILY MEDICINE | Facility: CLINIC | Age: 39
End: 2024-08-19
Payer: COMMERCIAL

## 2024-08-19 VITALS
OXYGEN SATURATION: 100 % | HEART RATE: 84 BPM | SYSTOLIC BLOOD PRESSURE: 128 MMHG | RESPIRATION RATE: 16 BRPM | BODY MASS INDEX: 30.78 KG/M2 | HEIGHT: 66 IN | DIASTOLIC BLOOD PRESSURE: 87 MMHG | WEIGHT: 191.5 LBS | TEMPERATURE: 98.3 F

## 2024-08-19 DIAGNOSIS — Z76.89 ENCOUNTER TO ESTABLISH CARE: ICD-10-CM

## 2024-08-19 DIAGNOSIS — B00.1 RECURRENT HERPES LABIALIS: Primary | ICD-10-CM

## 2024-08-19 DIAGNOSIS — N30.10 INTERSTITIAL CYSTITIS: ICD-10-CM

## 2024-08-19 DIAGNOSIS — E66.811 CLASS 1 OBESITY DUE TO EXCESS CALORIES WITHOUT SERIOUS COMORBIDITY WITH BODY MASS INDEX (BMI) OF 30.0 TO 30.9 IN ADULT: ICD-10-CM

## 2024-08-19 DIAGNOSIS — K21.9 GASTROESOPHAGEAL REFLUX DISEASE WITHOUT ESOPHAGITIS: ICD-10-CM

## 2024-08-19 DIAGNOSIS — F33.1 MODERATE EPISODE OF RECURRENT MAJOR DEPRESSIVE DISORDER (H): ICD-10-CM

## 2024-08-19 DIAGNOSIS — E66.09 CLASS 1 OBESITY DUE TO EXCESS CALORIES WITHOUT SERIOUS COMORBIDITY WITH BODY MASS INDEX (BMI) OF 30.0 TO 30.9 IN ADULT: ICD-10-CM

## 2024-08-19 DIAGNOSIS — E55.9 VITAMIN D DEFICIENCY: ICD-10-CM

## 2024-08-19 DIAGNOSIS — L70.0 ACNE VULGARIS: ICD-10-CM

## 2024-08-19 PROBLEM — L71.9 ROSACEA: Status: ACTIVE | Noted: 2017-03-08

## 2024-08-19 PROBLEM — F41.1 GAD (GENERALIZED ANXIETY DISORDER): Status: ACTIVE | Noted: 2018-10-08

## 2024-08-19 PROCEDURE — 99214 OFFICE O/P EST MOD 30 MIN: CPT

## 2024-08-19 PROCEDURE — G2211 COMPLEX E/M VISIT ADD ON: HCPCS

## 2024-08-19 SDOH — HEALTH STABILITY: PHYSICAL HEALTH: ON AVERAGE, HOW MANY MINUTES DO YOU ENGAGE IN EXERCISE AT THIS LEVEL?: 30 MIN

## 2024-08-19 SDOH — HEALTH STABILITY: PHYSICAL HEALTH: ON AVERAGE, HOW MANY DAYS PER WEEK DO YOU ENGAGE IN MODERATE TO STRENUOUS EXERCISE (LIKE A BRISK WALK)?: 5 DAYS

## 2024-08-19 ASSESSMENT — ANXIETY QUESTIONNAIRES
GAD7 TOTAL SCORE: 3
2. NOT BEING ABLE TO STOP OR CONTROL WORRYING: NOT AT ALL
GAD7 TOTAL SCORE: 3
3. WORRYING TOO MUCH ABOUT DIFFERENT THINGS: SEVERAL DAYS
5. BEING SO RESTLESS THAT IT IS HARD TO SIT STILL: NOT AT ALL
1. FEELING NERVOUS, ANXIOUS, OR ON EDGE: SEVERAL DAYS
6. BECOMING EASILY ANNOYED OR IRRITABLE: NOT AT ALL
4. TROUBLE RELAXING: SEVERAL DAYS
IF YOU CHECKED OFF ANY PROBLEMS ON THIS QUESTIONNAIRE, HOW DIFFICULT HAVE THESE PROBLEMS MADE IT FOR YOU TO DO YOUR WORK, TAKE CARE OF THINGS AT HOME, OR GET ALONG WITH OTHER PEOPLE: SOMEWHAT DIFFICULT
7. FEELING AFRAID AS IF SOMETHING AWFUL MIGHT HAPPEN: NOT AT ALL

## 2024-08-19 ASSESSMENT — PATIENT HEALTH QUESTIONNAIRE - PHQ9: SUM OF ALL RESPONSES TO PHQ QUESTIONS 1-9: 6

## 2024-08-19 ASSESSMENT — SOCIAL DETERMINANTS OF HEALTH (SDOH): HOW OFTEN DO YOU GET TOGETHER WITH FRIENDS OR RELATIVES?: ONCE A WEEK

## 2024-08-19 NOTE — PROGRESS NOTES
"  Assessment & Plan   Problem List Items Addressed This Visit       Acne vulgaris     Current therapies include ivermectin. She will re-establish with [facility] and denies any additional needs today.         Gastroesophageal reflux disease without esophagitis     Stable without the use of medication.         Moderate episode of recurrent major depressive disorder (H)     Currently fairly well-managed with the use of duloxetine 30 mg daily as well as amitriptyline.  Concurrent diagnosis of anxiety.  She also utilizes the duloxetine for her known interstitial cystitis.  She notes that there are a lot of outside variables at this time, including a recent move, change of jobs, death of a family member, etc.  She does not desire to make any changes today.  I did encourage her to reach out in the coming months if life has settled down and she still feels as if mental health is suboptimally controlled.  She has been on Wellbutrin in the past and has found this helpful, so we discussed it could be worthwhile especially given her reports of lack of motivation and \"get up and go\" to trial the addition of this medication.  Patient expressed understanding of and agreement with this plan.  All questions were answered.         Recurrent herpes labialis - Primary     Outbreaks are relatively infrequent. Seem to be related to weather changes. Well controlled with Valtrex.  Denies need for refill today.         Vitamin D deficiency     Plans to have levels checked at her physical next week.         Interstitial cystitis     Relatively recent diagnosis.  She originally was prescribed pelvic floor therapy given some concerns to as it pertains to left-sided ovarian cyst and pain.  Ultimately, she was referred to urology, completed a cystoscopy and was diagnosed with interstitial cystitis.  Current therapies include diet, adequate fluid intake and duloxetine.  She reports that symptoms are relatively stable but she does " occasionally have exacerbations of pain.  She is establishing with [facility] and is hopeful that she will be able to establish with a urogynecologist at that location.  She denies any additional needs today.         Class 1 obesity due to excess calories without serious comorbidity with body mass index (BMI) of 30.0 to 30.9 in adult     Previous therapies include metformin. She was prescribed liraglutide back in [place] but did not start as she recently moved.   Walks 25 miles a week. Yoga 2x a week. Minimizes ETOH. Sedentary job. Recently eliminated meats and minimizes any fast food or processed food. Lots of fruits and vegetables. We discussed I question PCOS given her clinical picture and would recommend fasting insulin and other metabolic labs. She comes today specifically interested in medical weight management. She has no apparent contraindications to the use of GLP medications. Referral placed to comprehensive weight management today.          Relevant Orders    Adult Comprehensive Weight Management  Referral     Other Visit Diagnoses       Encounter to establish care        Upcoming annual physical with [facility]. Reviewed preventative medicine. Defers all labs and other workup at this time.           The longitudinal plan of care for the diagnosis(es)/condition(s) as documented were addressed during this visit. Due to the added complexity in care, I will continue to support Kemi in the subsequent management and with ongoing continuity of care.      Subjective   Kemi is a 39 year old, presenting for the following health issues:  Establish Care (Has PHY next week with GYN.) and Weight Loss        8/19/2024     2:52 PM   Additional Questions   Roomed by sac   Accompanied by self         8/19/2024     2:52 PM   Patient Reported Additional Medications   Patient reports taking the following new medications no     Encounter to establish care.  Recently moved from [place].   "Insurance has been challenging in terms of finding physicians that are covered.  Does have an appointment scheduled with [place] [facility] next week for her annual physical, therefore declines all screenings, immunizations and blood work today.  Her primary motivator today was to establish with someone who can help provide guidance in terms of medical weight management.         Objective    /87 (BP Location: Left arm, Patient Position: Sitting, Cuff Size: Adult Large)   Pulse 84   Temp 98.3  F (36.8  C) (Oral)   Resp 16   Ht 1.676 m (5' 6\")   Wt 86.9 kg (191 lb 8 oz)   LMP 08/01/2024 (Approximate)   SpO2 100%   BMI 30.91 kg/m    Body mass index is 30.91 kg/m .    Physical Exam  Vitals and nursing note reviewed.   Constitutional:       General: She is not in acute distress.     Appearance: Normal appearance.   Cardiovascular:      Rate and Rhythm: Normal rate and regular rhythm.   Pulmonary:      Effort: Pulmonary effort is normal. No respiratory distress.   Neurological:      Mental Status: She is alert.   Psychiatric:         Mood and Affect: Mood normal.         Behavior: Behavior normal.         Thought Content: Thought content normal.            Signed Electronically by: ANJANA Moreland CNP    "

## 2024-08-19 NOTE — ASSESSMENT & PLAN NOTE
Outbreaks are relatively infrequent. Seem to be related to weather changes. Well controlled with Valtrex.  Denies need for refill today.

## 2024-08-19 NOTE — ASSESSMENT & PLAN NOTE
Current therapies include ivermectin. She will re-establish with [facility] and denies any additional needs today.

## 2024-08-19 NOTE — ASSESSMENT & PLAN NOTE
Previous therapies include metformin. She was prescribed liraglutide back in [place] but did not start as she recently moved.   Walks 25 miles a week. Yoga 2x a week. Minimizes ETOH. Sedentary job. Recently eliminated meats and minimizes any fast food or processed food. Lots of fruits and vegetables. We discussed I question PCOS given her clinical picture and would recommend fasting insulin and other metabolic labs. She comes today specifically interested in medical weight management. She has no apparent contraindications to the use of GLP medications. Referral placed to comprehensive weight management today.

## 2024-08-19 NOTE — ASSESSMENT & PLAN NOTE
Relatively recent diagnosis.  She originally was prescribed pelvic floor therapy given some concerns to as it pertains to left-sided ovarian cyst and pain.  Ultimately, she was referred to urology, completed a cystoscopy and was diagnosed with interstitial cystitis.  Current therapies include diet, adequate fluid intake and duloxetine.  She reports that symptoms are relatively stable but she does occasionally have exacerbations of pain.  She is establishing with [facility] and is hopeful that she will be able to establish with a urogynecologist at that location.  She denies any additional needs today.

## 2024-09-18 ENCOUNTER — TRANSFERRED RECORDS (OUTPATIENT)
Dept: MULTI SPECIALTY CLINIC | Facility: CLINIC | Age: 39
End: 2024-09-18
Payer: COMMERCIAL

## 2024-09-18 ENCOUNTER — MYC MEDICAL ADVICE (OUTPATIENT)
Dept: FAMILY MEDICINE | Facility: CLINIC | Age: 39
End: 2024-09-18
Payer: COMMERCIAL

## 2024-09-18 LAB — PAP SMEAR - HIM PATIENT REPORTED: NORMAL

## 2024-10-09 ASSESSMENT — SLEEP AND FATIGUE QUESTIONNAIRES
HOW LIKELY ARE YOU TO NOD OFF OR FALL ASLEEP WHILE SITTING AND TALKING TO SOMEONE: WOULD NEVER DOZE
HOW LIKELY ARE YOU TO NOD OFF OR FALL ASLEEP WHILE WATCHING TV: WOULD NEVER DOZE
HOW LIKELY ARE YOU TO NOD OFF OR FALL ASLEEP WHILE SITTING QUIETLY AFTER LUNCH WITHOUT ALCOHOL: SLIGHT CHANCE OF DOZING
HOW LIKELY ARE YOU TO NOD OFF OR FALL ASLEEP WHILE LYING DOWN TO REST IN THE AFTERNOON WHEN CIRCUMSTANCES PERMIT: HIGH CHANCE OF DOZING
HOW LIKELY ARE YOU TO NOD OFF OR FALL ASLEEP WHILE SITTING AND READING: WOULD NEVER DOZE
HOW LIKELY ARE YOU TO NOD OFF OR FALL ASLEEP WHILE SITTING INACTIVE IN A PUBLIC PLACE: WOULD NEVER DOZE
HOW LIKELY ARE YOU TO NOD OFF OR FALL ASLEEP WHEN YOU ARE A PASSENGER IN A CAR FOR AN HOUR WITHOUT A BREAK: WOULD NEVER DOZE
HOW LIKELY ARE YOU TO NOD OFF OR FALL ASLEEP IN A CAR, WHILE STOPPED FOR A FEW MINUTES IN TRAFFIC: WOULD NEVER DOZE

## 2024-10-10 ENCOUNTER — OFFICE VISIT (OUTPATIENT)
Dept: FAMILY MEDICINE | Facility: CLINIC | Age: 39
End: 2024-10-10
Payer: COMMERCIAL

## 2024-10-10 VITALS
RESPIRATION RATE: 16 BRPM | HEIGHT: 66 IN | SYSTOLIC BLOOD PRESSURE: 138 MMHG | DIASTOLIC BLOOD PRESSURE: 92 MMHG | WEIGHT: 195 LBS | BODY MASS INDEX: 31.34 KG/M2 | TEMPERATURE: 98.7 F | HEART RATE: 71 BPM | OXYGEN SATURATION: 100 %

## 2024-10-10 DIAGNOSIS — E78.00 HYPERCHOLESTEREMIA: ICD-10-CM

## 2024-10-10 DIAGNOSIS — Z13.0 SCREENING FOR ENDOCRINE, NUTRITIONAL, METABOLIC AND IMMUNITY DISORDER: ICD-10-CM

## 2024-10-10 DIAGNOSIS — E66.811 CLASS 1 OBESITY DUE TO EXCESS CALORIES WITHOUT SERIOUS COMORBIDITY WITH BODY MASS INDEX (BMI) OF 31.0 TO 31.9 IN ADULT: Primary | ICD-10-CM

## 2024-10-10 DIAGNOSIS — Z13.220 SCREENING FOR LIPID DISORDERS: ICD-10-CM

## 2024-10-10 DIAGNOSIS — Z13.29 SCREENING FOR ENDOCRINE, NUTRITIONAL, METABOLIC AND IMMUNITY DISORDER: ICD-10-CM

## 2024-10-10 DIAGNOSIS — E66.09 CLASS 1 OBESITY DUE TO EXCESS CALORIES WITHOUT SERIOUS COMORBIDITY WITH BODY MASS INDEX (BMI) OF 31.0 TO 31.9 IN ADULT: Primary | ICD-10-CM

## 2024-10-10 DIAGNOSIS — Z13.228 SCREENING FOR ENDOCRINE, NUTRITIONAL, METABOLIC AND IMMUNITY DISORDER: ICD-10-CM

## 2024-10-10 DIAGNOSIS — Z13.1 SCREENING FOR DIABETES MELLITUS: ICD-10-CM

## 2024-10-10 DIAGNOSIS — Z13.21 SCREENING FOR ENDOCRINE, NUTRITIONAL, METABOLIC AND IMMUNITY DISORDER: ICD-10-CM

## 2024-10-10 LAB
ALBUMIN SERPL BCG-MCNC: 4.4 G/DL (ref 3.5–5.2)
ALP SERPL-CCNC: 88 U/L (ref 40–150)
ALT SERPL W P-5'-P-CCNC: 10 U/L (ref 0–50)
ANION GAP SERPL CALCULATED.3IONS-SCNC: 10 MMOL/L (ref 7–15)
AST SERPL W P-5'-P-CCNC: 17 U/L (ref 0–45)
BILIRUB SERPL-MCNC: 0.3 MG/DL
BUN SERPL-MCNC: 11.7 MG/DL (ref 6–20)
CALCIUM SERPL-MCNC: 9 MG/DL (ref 8.8–10.4)
CHLORIDE SERPL-SCNC: 103 MMOL/L (ref 98–107)
CHOLEST SERPL-MCNC: 243 MG/DL
CREAT SERPL-MCNC: 0.69 MG/DL (ref 0.51–0.95)
EGFRCR SERPLBLD CKD-EPI 2021: >90 ML/MIN/1.73M2
EST. AVERAGE GLUCOSE BLD GHB EST-MCNC: 105 MG/DL
FASTING STATUS PATIENT QL REPORTED: YES
FASTING STATUS PATIENT QL REPORTED: YES
GLUCOSE SERPL-MCNC: 92 MG/DL (ref 70–99)
HBA1C MFR BLD: 5.3 % (ref 0–5.6)
HCO3 SERPL-SCNC: 24 MMOL/L (ref 22–29)
HDLC SERPL-MCNC: 60 MG/DL
INSULIN SERPL-ACNC: 15.6 UU/ML (ref 2.6–24.9)
LDLC SERPL CALC-MCNC: 160 MG/DL
NONHDLC SERPL-MCNC: 183 MG/DL
POTASSIUM SERPL-SCNC: 3.8 MMOL/L (ref 3.4–5.3)
PROT SERPL-MCNC: 7.1 G/DL (ref 6.4–8.3)
SODIUM SERPL-SCNC: 137 MMOL/L (ref 135–145)
TRIGL SERPL-MCNC: 113 MG/DL
TSH SERPL DL<=0.005 MIU/L-ACNC: 1.17 UIU/ML (ref 0.3–4.2)

## 2024-10-10 PROCEDURE — 80061 LIPID PANEL: CPT | Performed by: FAMILY MEDICINE

## 2024-10-10 PROCEDURE — 83525 ASSAY OF INSULIN: CPT | Performed by: FAMILY MEDICINE

## 2024-10-10 PROCEDURE — 83036 HEMOGLOBIN GLYCOSYLATED A1C: CPT | Performed by: FAMILY MEDICINE

## 2024-10-10 PROCEDURE — 84443 ASSAY THYROID STIM HORMONE: CPT | Performed by: FAMILY MEDICINE

## 2024-10-10 PROCEDURE — 99215 OFFICE O/P EST HI 40 MIN: CPT | Performed by: FAMILY MEDICINE

## 2024-10-10 PROCEDURE — G2211 COMPLEX E/M VISIT ADD ON: HCPCS | Performed by: FAMILY MEDICINE

## 2024-10-10 PROCEDURE — 80053 COMPREHEN METABOLIC PANEL: CPT | Performed by: FAMILY MEDICINE

## 2024-10-10 PROCEDURE — 36415 COLL VENOUS BLD VENIPUNCTURE: CPT | Performed by: FAMILY MEDICINE

## 2024-10-10 NOTE — PATIENT INSTRUCTIONS
"Concepts/considerations that matter in discussing metabolic health: These considerations are tough to disentangle from ONE another.    Nutrition:   - Caloric restriction  - Time restricted eating  - Nutritional restriction    Movement:  - Important for weight loss maintenance.  Rarely the main  of initial weight loss.    Sleep:  - Snoring behaviors?  - Inadequate sleep?    Distress (versus stress):  - How good is an individual at relaxing?  What is the total work load?  -A person's cortisol levels (stress hormones) can cause weight gain.    Other:  - Iatrogenic?  Medication side effects  - Genetic?  -Other?       Categories of considerations for weight gain:    Metabolic Syndrome:      Thyroid: For men and women age 39 and less we generally target 0.3-3.0.    Overeating versus under eating   -Binging behaviors?  - Other eating disorder behaviors.  Restrictive behaviors or purging behaviors?    Inflammation    Women's health: PCOS?  History of gestational diabetes?     100+ grams of protein per day  800 grams of veggies/fruit day   Do not drink your calories    CGM -continuous glucose monitor: Prices vary.  Freestyle zuly line generally $80-$100    GLP-1 receptor agonist drug class side effects:    Bureaucracy: Cost, possibility of weight regain?    Not generally covered by Medicare (exception for some plans are for individuals with histories of heart disease).  The typical patient will likely regain weight after going off the medicine.  (Most patients who have gone off of the medicine have not gone off the medicine on his/her \"own terms\").  Think of these medicines in the 5 or 10-year time increments.    Delayed gastric emptying:  These are the side effects that most commonly result in individuals going off of these therapies.  Slowing of the digestive tract.  Food will stay in the stomach longer.  Most common side effect is nausea and queasiness which tends to improve.    Some individuals experience random " throwing up.  In my opinion, the medications not worth it if this is occurring.  Constipation.      Effect on mood:  I have had a couple of patients experience dramatic changes in level of anxiety and depression.  There are case reports in the literature.  Some individuals use food as a coping mechanism.  If this coping mechanism is no longer possible given the effect of the medicine, some individuals struggle.    Physical Fragility:  Weight loss due to loss of muscle vs burning of fat.  I am concerned that individuals who take these medications long-term are at risk for excessive loss of muscle (medical term: sarcopenia).  Low muscle mass in older individuals leads to injury and decreased overall function.   Strategies to mitigate loss of muscle include dietary practices focused on protein and focusing exercise activities on strength training with functional fitness goals.     Contraindications:  Personal or family history of medullary thyroid cancer, multiple endocrine neoplasm syndrome.  There are some reports that lifetime risk of thyroid cancer increases 2-3x.  This is a rare cancer to begin with.  Personal history of pancreatitis.    GLP1-ra options:    Wegovy (or Ozempic) aka semaglutide: cost per month retail ~$1400      Zepbound (or Mounjaro) aka tirzepatide: cost per month retail ~$1100 (there is an online coupon)      Saxenda (or Victoza) aka liraglutide: cost per month retail: ~$1400    Compounded options for GLPs:    How long will the therapy to be the an option?      - https://www.npr.org/sections/shots-health-news/2024/07/24/wd-c4-1065170/cheaper-semaglutide-wegovy-ozempic-zepbound-copies-could-disappear  (July 2024)    Semaglutide: 1 month multi-dose vial  - 1mL (dose 1mg or less), $230  - 2mL (dose over 1mg) , $370     Tirzepatide not available as of 10/3/2024 when the shortage concluded.

## 2024-10-10 NOTE — PROGRESS NOTES
"    New Medical Weight Management Consult    PATIENT:  Kemi Mckoy  MRN:         [id number]  :         1985  LEA:         10/10/2024    Dear Lizzette De La O DNP,    I had the pleasure of seeing your patient, Kmei Mckoy. Full intake/assessment was done to determine barriers to weight loss success and develop a treatment plan. Kemi Mckoy is a 39 year old female interested in treatment of medical problems associated with excess weight. She has a height of 5' 6.25\", a weight of 195 lbs 0 oz, and the calculated Body mass index is 31.24 kg/m .    ASSESSMENT/PLAN:  Class 1 obesity due to excess calories without serious comorbidity with body mass index (BMI) of 31.0 to 31.9 in adult  39 year old year old female in clinic today to discuss treatment of the following conditions through diet and lifestyle modification and weight loss:  1. Class 1 obesity due to excess calories without serious comorbidity with body mass index (BMI) of 31.0 to 31.9 in adult    2. Hypercholesteremia    3. Screening for lipid disorders    4. Screening for diabetes mellitus    5. Screening for endocrine, nutritional, metabolic and immunity disorder      This patient returns to clinic to discuss metabolic health and weight.  She has had a couple of visits during the  calendar year focused on metabolic health and weight.  She saw a provider in May 2024 when she lived in [place] who is going to prescribe liraglutide for medical weight loss.  Given that she was moving back to [place] the medication was not stopped but they did discuss lifestyle changes including a shift towards protein and fiber containing vegetables.  She made this shift.  She then saw another provider in my clinic as recently as August they discussed caloric restricted, nutritionally restricted (less carbohydrates) diets as well as increased physical activity.  The patient has been diligent in executing these changes and despite her best efforts has " "gained weight.  She will see the bariatric dietitian next month.  There are no contraindications to GLP-1 receptor agonist therapies.  It is possible there is some degree of iatrogenic weight gain given amitriptyline and hydroxychloroquine (and perhaps duloxetine).  Given her description of her current lifestyle, I think it is unlikely that she would be able to further reduce calories or make additional meaningful changes.  We discussed a trial of reduced dosing of certain medications.  It is time for her to start a GLP-1 receptor agonist.  Discussed tirzepatide is the medicine of choice.  Zepbound 2.5 mg/week dose prescribed.  Will titrate to 5 mg or 10 mg.  I like to see this patient back in 3 months or so to review progress.  I am optimistic that the medication will be powerful.  There is an outstanding question of PCOS.  Labs as ordered today as she has not had recent metabolic labs.    Follow-up: 3 months    FOLLOW-UP:   12 weeks.    SUBJECTIVE:     She has the following co-morbidities:        10/9/2024     1:53 PM   --   I have the following health issues associated with obesity None of the above   I have the following symptoms associated with obesity None of the above     Narrative History:    - gained 37 lbs while in HS while studying in [place].  Much of the weight dropped off.  \"I wasn't eating nutella.\"   - history of DUB but her symptoms tended to be better at a high weight.  She was at 125-135 +/-   - in 2020: interstitial cystitis symptoms started.  Weight gain   - has dog and walks.  Right LQ pain with cysts.  Birth control.  PCOS diagnosis: unclear   - protein sources: fish.  \"Diets.\"    - pelvic floor PT?  Symptoms improved but not her bladder.  This lead to diagnosis of interstitial cystitis.  \"It's managed.\"    - no SSBs   - walking a lot.  25 miles per week.     - she has been on metformin with a headaches.         10/9/2024     1:53 PM   Referring Provider   Please name the provider who referred " "you to Medical Weight Management  If you do not know, please answer \"I Don't Know\" Andmilly         10/9/2024     1:53 PM   Weight History   How concerned are you about your weight? Very Concerned   I became overweight As an Adult   The following factors have contributed to my weight gain Change in Schedule    Started on Medication that Caused Weight Gain    Lack of Exercise    Stress   I have tried the following methods to lose weight Exercise    Medications   My lowest weight since age 17 was 113   My highest weight since age 17 was 193   The most weight I have ever lost was (lbs) 31   I have the following family history of obesity/being overweight My mother is overweight    My father is overweight   How has your weight changed over the last year? Gained   How many pounds? 30         10/9/2024     1:53 PM   Diet Recall Review with Patient   If you do eat lunch, what types of food do you typically eat? Fish Protein & Salad   If you do eat supper, what types of food do you typically eat? Fish protein, veggie, starch   If you do snack, what types of food do you typically eat? Cookies   How many glasses of juice do you drink in a typical day? 0   How many of glasses of milk do you drink in a typical day? 0   How many 8oz glasses of sugar containing drinks such as Brian-Aid/sweet tea do you drink in a day? 0   How many cans/bottles of sugar pop/soda/tea/sports drinks do you drink in a day? 0   How many cans/bottles of diet pop/soda/tea or sports drink do you drink in a day? 0   How often do you have a drink of alcohol? Monthly or Less   If you do drink, how many drinks might you have in a day? 1 or 2         10/9/2024     1:53 PM   Eating Habits   Generally, my meals include foods like these bread, pasta, rice, potatoes, corn, crackers, sweet dessert, pop, or juice Almost Everyday   Generally, my meals include foods like these fried meats, brats, burgers, french fries, pizza, cheese, chips, or ice cream Never   Eat fast " food (like McDonalds, Burger Man, Taco Bell) Less Than Weekly   Eat at a buffet or sit-down restaurant Less Than Weekly   Eat most of my meals in front of the TV or computer Almost Everyday   Often skip meals, eat at random times, have no regular eating times Almost Everyday   Rarely sit down for a meal but snack or graze throughout Never   Eat extra snacks between meals Less Than Weekly   Eat most of my food at the end of the day Almost Everyday   Eat in the middle of the night or wake up at night to eat Never   Eat extra snacks to prevent or correct low blood sugar A Few Times a Week   Eat to prevent acid reflux or stomach pain Never   Worry about not having enough food to eat Never   I eat when I am depressed Almost Everyday   I eat when I am stressed Almost Everyday   I eat when I am bored Never   I eat when I am anxious Never   I eat when I am happy or as a reward Less Than Weekly   I feel hungry all the time even if I just have eaten Never   Feeling full is important to me Almost Everyday   I finish all the food on my plate even if I am already full Never   I can't resist eating delicious food or walk past the good food/smell Never   I eat/snack without noticing that I am eating Never   I eat when I am preparing the meal Less Than Weekly   I eat more than usual when I see others eating Never   I have trouble not eating sweets, ice cream, cookies, or chips if they are around the house Once a Week   I think about food all day Never   What foods, if any, do you crave? Sweets/Candy/Chocolate   Please list any other foods you crave? Cookies         10/9/2024     1:53 PM   Amount of Food   I feel out of control when eating Weekly   I eat a large amount of food, like a loaf of bread, a box of cookies, a pint/quart of ice cream, all at once Never   I eat a large amount of food even when I am not hungry Never   I eat rapidly Never   I eat alone because I feel embarrassed and do not want others to see how much I have  eaten Never   I eat until I am uncomfortably full Never   I feel bad, disgusted, or guilty after I overeat Monthly         10/9/2024     1:53 PM   Activity/Exercise History   How much of a typical 12 hour day do you spend sitting? Most of the Day   How much of a typical 12 hour day do you spend lying down? Half the Day   How much of a typical day do you spend walking/standing? Less Than Half the Day   How many hours (not including work) do you spend on the TV/Video Games/Computer/Tablet/Phone? 4-5 Hours   How many times a week are you active for the purpose of exercise? 2-3 Times a Week   What keeps you from being more active? Lack of Time    Too tired   How many total minutes do you spend doing some activity for the purpose of exercising when you exercise? More Than 30 Minutes     PAST MEDICAL HISTORY:  Past Medical History:   Diagnosis Date    Depressive disorder 1999         10/9/2024     1:53 PM   Work/Social History Reviewed With Patient   My employment status is Full-Time   My job is Self Employed Consultant   How much of your job is spent on the computer or phone? 100%   How many hours do you spend commuting to work daily? 0   What is your marital status? Single   Who do you live with? Boyfriend   Who does the food shopping? Self         10/9/2024     1:53 PM   Mental Health History Reviewed With Patient   Have you ever been physically or sexually abused? No   How often in the past 2 weeks have you felt little interest or pleasure in doing things? For Several Days   Over the past 2 weeks how often have you felt down, depressed, or hopeless? For Several Days         10/9/2024     1:53 PM   Sleep History Reviewed With Patient   How many hours do you sleep at night? 9     MEDICATIONS:   Current Outpatient Medications   Medication Sig Dispense Refill    amitriptyline (ELAVIL) 10 MG tablet Take 1 tablet (10 mg) by mouth at bedtime 90 tablet 1    Cholecalciferol (VITAMIN D3) 2000 units CAPS Take 2,000 Units by  "mouth      cimetidine (TAGAMET) 300 MG tablet Take 1 tablet (300 mg) by mouth 3 times daily 270 tablet 0    clobetasol propionate (TEMOVATE) 0.05 % external cream Apply topically 2 times daily Apply to affected areas on thighs, knee, and elbows. Use for two weeks max per flare. 45 g 1    conjugated estrogens (PREMARIN) 0.625 MG/GM vaginal cream Place 0.5 g vaginally three times a week 30 g 1    cyclobenzaprine (FLEXERIL) 5 MG tablet Take 1 tablet (5 mg) by mouth 3 times daily as needed for muscle spasms 60 tablet 1    DULoxetine (CYMBALTA) 30 MG capsule Take 1 capsule (30 mg) by mouth 2 times daily (Patient taking differently: Take 30 mg by mouth 2 times daily. Patient takes once daily) 90 capsule 1    hydrOXYzine HCl (ATARAX) 50 MG tablet Take 1 tablet (50 mg) by mouth 2 times daily 180 tablet 1    ivermectin (SOOLANTRA) 1 % cream Apply to the affected areas of the face once daily. Use a pea-size amount for each area of the face (forehead, chin, nose, each cheek) that is affected. Spread as a thin layer, avoiding the eyes and lips. 45 g 1    ondansetron (ZOFRAN ODT) 4 MG ODT tab Take 1 tablet (4 mg) by mouth every 8 hours as needed for nausea 20 tablet 2    tirzepatide-Weight Management (ZEPBOUND) 2.5 MG/0.5ML prefilled pen Inject 0.5 mLs (2.5 mg) subcutaneously every 7 days. (Send message to clinic after first 2 doses.  We will send the \"next step\" in the titration schedule.) 2 mL 0    valACYclovir (VALTREX) 1000 mg tablet Take 2 tablets (2,000 mg) by mouth 2 times daily for 1 day 4 tablet 3     ALLERGIES:   Allergies   Allergen Reactions    No Known Drug Allergy      PHYSICAL EXAM:  Physical Exam  Nursing note reviewed.   Constitutional:       General: She is not in acute distress.     Appearance: Normal appearance. She is not ill-appearing.   HENT:      Head: Normocephalic and atraumatic.   Eyes:      Extraocular Movements: Extraocular movements intact.      Conjunctiva/sclera: Conjunctivae normal.   Pulmonary: "      Effort: Pulmonary effort is normal.   Neurological:      Mental Status: She is alert and oriented to person, place, and time.   Psychiatric:         Attention and Perception: Attention normal.         Mood and Affect: Mood normal.         Speech: Speech normal.         Thought Content: Thought content normal.       55 minutes spent on the date of the encounter doing chart review, history and exam, documentation and further activities per the note    This note has been dictated using voice recognition software. Any grammatical or context distortions are unintentional and inherent to the software    Sincerely,    Arnulfo Archuleta MD  Diplomate - American Board of Obesity Medicine  Diplomate - American Board of Family Medicine  [facility] - Comprehensive Weight Management

## 2024-10-10 NOTE — ASSESSMENT & PLAN NOTE
39 year old year old female in clinic today to discuss treatment of the following conditions through diet and lifestyle modification and weight loss:  1. Class 1 obesity due to excess calories without serious comorbidity with body mass index (BMI) of 31.0 to 31.9 in adult    2. Hypercholesteremia    3. Screening for lipid disorders    4. Screening for diabetes mellitus    5. Screening for endocrine, nutritional, metabolic and immunity disorder      This patient returns to clinic to discuss metabolic health and weight.  She has had a couple of visits during the 2024 calendar year focused on metabolic health and weight.  She saw a provider in May 2024 when she lived in [place] who is going to prescribe liraglutide for medical weight loss.  Given that she was moving back to [place] the medication was not stopped but they did discuss lifestyle changes including a shift towards protein and fiber containing vegetables.  She made this shift.  She then saw another provider in my clinic as recently as August they discussed caloric restricted, nutritionally restricted (less carbohydrates) diets as well as increased physical activity.  The patient has been diligent in executing these changes and despite her best efforts has gained weight.  She will see the bariatric dietitian next month.  There are no contraindications to GLP-1 receptor agonist therapies.  It is possible there is some degree of iatrogenic weight gain given amitriptyline and hydroxychloroquine (and perhaps duloxetine).  Given her description of her current lifestyle, I think it is unlikely that she would be able to further reduce calories or make additional meaningful changes.  We discussed a trial of reduced dosing of certain medications.  It is time for her to start a GLP-1 receptor agonist.  Discussed tirzepatide is the medicine of choice.  Zepbound 2.5 mg/week dose prescribed.  Will titrate to 5 mg or 10 mg.  I like to see this patient back in 3  months or so to review progress.  I am optimistic that the medication will be powerful.  There is an outstanding question of PCOS.  Labs as ordered today as she has not had recent metabolic labs.    Follow-up: 3 months

## 2024-10-23 ENCOUNTER — MYC MEDICAL ADVICE (OUTPATIENT)
Dept: FAMILY MEDICINE | Facility: CLINIC | Age: 39
End: 2024-10-23
Payer: COMMERCIAL

## 2024-10-23 DIAGNOSIS — E66.811 CLASS 1 OBESITY DUE TO EXCESS CALORIES WITHOUT SERIOUS COMORBIDITY WITH BODY MASS INDEX (BMI) OF 31.0 TO 31.9 IN ADULT: Primary | ICD-10-CM

## 2024-10-23 DIAGNOSIS — E66.09 CLASS 1 OBESITY DUE TO EXCESS CALORIES WITHOUT SERIOUS COMORBIDITY WITH BODY MASS INDEX (BMI) OF 31.0 TO 31.9 IN ADULT: Primary | ICD-10-CM

## 2024-10-24 RX ORDER — TIRZEPATIDE 5 MG/.5ML
5 INJECTION, SOLUTION SUBCUTANEOUS
Qty: 2 ML | Refills: 0 | Status: SHIPPED | OUTPATIENT
Start: 2024-10-24

## 2024-11-03 ENCOUNTER — TELEPHONE (OUTPATIENT)
Dept: FAMILY MEDICINE | Facility: CLINIC | Age: 39
End: 2024-11-03
Payer: COMMERCIAL

## 2024-11-03 DIAGNOSIS — N30.10 INTERSTITIAL CYSTITIS: ICD-10-CM

## 2024-11-04 RX ORDER — DULOXETIN HYDROCHLORIDE 30 MG/1
30 CAPSULE, DELAYED RELEASE ORAL DAILY
Qty: 90 CAPSULE | Refills: 2 | Status: SHIPPED | OUTPATIENT
Start: 2024-11-04

## 2024-11-04 RX ORDER — CIMETIDINE 300 MG
TABLET ORAL
Qty: 270 TABLET | Refills: 2 | Status: SHIPPED | OUTPATIENT
Start: 2024-11-04

## 2024-11-04 NOTE — TELEPHONE ENCOUNTER
Please let patient know that I have sent in the prescription. There is a significant interaction between her amitriptyline and her duloxetine called serotonin syndrome. I know she was on both of these medications before I met her and has not had any issues. She is also on a relatively low dose of both. However, please educate her on signs/symptoms of serotonin syndrome and we can always try alternative medications if appropriate. Thanks!

## 2024-11-04 NOTE — TELEPHONE ENCOUNTER
Left message to call back for:  Kemi  Information to relay to patient:  Please transfer to [facility] RN to discuss provider message below.        Angi Banks RN  [facility]

## 2024-11-04 NOTE — TELEPHONE ENCOUNTER
Kemi calling back.  Lizzette De La O NP message relayed.  Discussed the symptoms and instructed to let us know if she experiences these symptoms.

## 2024-11-05 ENCOUNTER — TELEPHONE (OUTPATIENT)
Dept: FAMILY MEDICINE | Facility: CLINIC | Age: 39
End: 2024-11-05
Payer: COMMERCIAL

## 2024-11-05 NOTE — TELEPHONE ENCOUNTER
Prior Authorization Retail Medication Request    Medication/Dose: conjugated estrogens (PREMARIN) 0.625 MG/GM vaginal cream   Diagnosis and ICD code (if different than what is on RX):  Interstitial cystitis [N30.10]     Insurance   Primary: [facility]   Insurance ID:  [id number]     Pharmacy Information (if different than what is on RX)  Name:  [facility]  Phone:  [phone]  Fax: [phone]

## 2024-11-07 NOTE — TELEPHONE ENCOUNTER
PRIOR AUTHORIZATION DENIED    Medication: PREMARIN 0.625 MG/GM VA CREA  Insurance Company: [employer] - Phone [phone] Fax [phone]  Denial Date: 11/7/2024  Denial Rational:         Appeal Information:   If provider would like to appeal please review the plan's reasons for denial listed above. Please utilize that information to complete letter and provide specific, detailed clinical information/rationale of your patient's health status to address their denial reasons.    Patient Notified: No

## 2024-11-07 NOTE — TELEPHONE ENCOUNTER
Dr. Obrien --    Did you want to prescribe something else since PA denied.     Sherry Harden, BSN RN  [facility]

## 2024-11-07 NOTE — TELEPHONE ENCOUNTER
Retail Pharmacy Prior Authorization Team   Phone: [phone]    PA Initiation    Medication: PREMARIN 0.625 MG/GM VA CREA  Insurance Company: [employer] - Phone [phone] Fax [phone]  Pharmacy Filling the Rx: [id number] - [place], [place] - [address] [place] [address] AT [facility] [address]  Filling Pharmacy Phone: [phone]  Filling Pharmacy Fax:    Start Date: 11/7/2024

## 2024-11-08 ENCOUNTER — MYC MEDICAL ADVICE (OUTPATIENT)
Dept: FAMILY MEDICINE | Facility: CLINIC | Age: 39
End: 2024-11-08
Payer: COMMERCIAL

## 2024-11-08 DIAGNOSIS — N30.10 INTERSTITIAL CYSTITIS: Primary | ICD-10-CM

## 2024-11-08 NOTE — LETTER
2024    To:   [Insurance Company]  [Address]    RE: Kemi Mckoy  [address]  [place] [address]  : 1985  MRN: [id number]  Policy #: ***  Case/Reference: ***    To Whom It May Concern,    I am writing on behalf of my patient, Kemi Mckoy to document the medical necessity of conjugated estrogens (Premarin) vaginal cream,  for the treatment of interstitial cystitis and associated chronic pelvic pain syndrome. This letter provides information about the patient's medical history and diagnosis and a statement summarizing my treatment rationale.     Summary of Patient History and Diagnosis        Treatment Rationale  (Include information on the treatment up to this point, course of care and why the treatment/medication/equipment is necessary and how you expect that it will help the patient. Include any relevant clinical studies).      Duration  (Length of time treatment/medication/equipment (item in question) is necessary - not to exceed 12 months)       Summary  In summary, *** is medically necessary for this patient s medical condition. Please call my office at *** if I can provide you with any additional information to approve my request. I look forward to receiving your timely response and approval of this request.     Sincerely,    Lizzette De La O {PROVIDERCREDENTIALS:[id number]}

## 2024-11-11 NOTE — TELEPHONE ENCOUNTER
Left message on answering machine for patient to call clinic triage.   Lm on unidentified VM.  SANTHOSH Bess

## 2024-11-12 ENCOUNTER — VIRTUAL VISIT (OUTPATIENT)
Dept: SURGERY | Facility: CLINIC | Age: 39
End: 2024-11-12
Payer: COMMERCIAL

## 2024-11-12 VITALS — BODY MASS INDEX: 29.41 KG/M2 | WEIGHT: 183 LBS | HEIGHT: 66 IN

## 2024-11-12 DIAGNOSIS — E66.3 OVERWEIGHT: Primary | ICD-10-CM

## 2024-11-12 DIAGNOSIS — Z71.3 NUTRITIONAL COUNSELING: ICD-10-CM

## 2024-11-12 PROCEDURE — 97802 MEDICAL NUTRITION INDIV IN: CPT | Mod: 95 | Performed by: DIETITIAN, REGISTERED

## 2024-11-12 RX ORDER — ESTRADIOL 0.1 MG/G
2 CREAM VAGINAL
Qty: 42.5 G | Refills: 3 | Status: SHIPPED | OUTPATIENT
Start: 2024-11-13

## 2024-11-12 NOTE — PROGRESS NOTES
"Kemi Mckoy is a 39 year old who is being evaluated via a billable video visit.      How would you like to obtain your AVS? MyChart  If the video visit is dropped, the invitation should be resent by: Send to e-mail at: [email or web address]  Will anyone else be joining your video visit? No        Medical Weight Loss Initial Diet Evaluation  Assessment:  This patient was referred by Dr. Fermin for MNT as treatment for Obesity.     Kemi is presenting today for a new weight management nutrition consultation. Pt has had an initial appointment with Dr. Archuleta.    Weight loss medication:  zepbound .     Personal Goals: goal weight is 130lb, was this weight in 2019     Anthropometrics:    Pt's weight is 183 lbs 0 oz  Initial weight: 195lb  Weight change: down 12lb  BMI: Body mass index is 29.31 kg/m .   Ideal body weight: 59.9 kg (132 lb)  Adjusted ideal body weight: 71.3 kg (157 lb 3.2 oz)  Estimated RMR ([place] equation):  1365 kcals x 1.2 (sedentary) = 1638 kcals (for weight maintenance)    Recommended Protein Intake: 70-94 grams of protein/day- based on 1.2-1.6g/kg goal weight    Medical History:  Patient Active Problem List   Diagnosis    Acne vulgaris    Gastroesophageal reflux disease without esophagitis    GIOVANNI (generalized anxiety disorder)    Moderate episode of recurrent major depressive disorder (H)    Recurrent herpes labialis    Rosacea    Vitamin D deficiency    Interstitial cystitis    Class 1 obesity due to excess calories without serious comorbidity with body mass index (BMI) of 31.0 to 31.9 in adult    Hypercholesteremia      HbA1c:  No results found for: \"HGBA1C\"  Biggest struggle: eating late at night, avoiding complex carbohydrates    Nutrition History:   Food allergies/intolerances/cultural or religous food customs: Yes face turns red with gluten  Weight loss history: weight gain in 2020, dx interstitial cystitis- Mediterranean Diet and high acid (nery, chocolate, tomatoes, " etc.)  New dx of PCOS-thinning hair, acne, skin tag, fatigue, regular periods 90% of the time, ovarian pain during ovulation and cycle- 26 cycle  -had given up meat and eating fish only, having a hard time getting 100g protein  Vitamins/Mineral Supplementation: MVI, magnesium     Dietary Recall:  Breakfast: wake up around 8-9a- eating for the first time at 2pm  Lunch: 2pm- tuna on lettuce with celery or carrots  Dinner: 7pm- vegetable, rice, farro or potatoes, sometimes fish  Typical Snacks: rare- if snacking will choose popcorn, apples and peanut butter, cookie is vice  Overnight eating: No  Eating out: 2    Beverages: only water- 60-80oz     Exercise: gym 3 times per week, walking    Nutrition Diagnosis (PES statement):     Overweight related to excessive energy intake as evidence by inadequate protein, lack of activity and BMI of 29.31       Nutrition Intervention  Food and/or Nutrient Delivery   Placed emphasis on importance of developing a healthy meal routine, aiming for 3 meals a day and no snacks.  Discussed nutrition therapy for PCOS, adding in inositol, berberine and magnesium, focusing on mediterranean style way of eating, plant forward foods  Nutrition Education   Discussed with patient how to build a meal: the importance of including a lean/low fat protein at each meal, include a source of vegetables at a minimum of lunch and dinner and limiting carbohydrate intake  Educated on sources of lean protein, portion sizes, the amount of grams found in each source. Recommend patient to aim for 20-30g protein at each meal.  Educated on plant based protein  Discussed the importance of adequate hydration, with emphasis on drinking 64oz of water or zero calorie beverages per day.  Nutrition Counseling   Encouraged importance of developing routine exercise for health benefits and weight loss.    Goals established by patient:   70-95g protein per day, focus on plant based proteins to help boost fiber  Start taking  supplements for PCOS  Handouts provided:  PCOS keys to success  Plant based proteins    Assessment/Plan:    Pt will follow up in 1 month(s) with bariatrician and 2 month(s) with dietitian.       Video-Visit Details    Type of service:  Video Visit    Video Start Time (time video started): 11:30a    Video End Time (time video stopped): 12:00p    Originating Location (pt. Location): Home      Distant Location (provider location):  Off-site    Mode of Communication:  Video Conference via [facility]    Physician has received verbal consent for a Video Visit from the patient? Yes      Angelita Fung RD

## 2024-11-12 NOTE — LETTER
"11/12/2024      Kemi Mckoy  [address]  [address]  [place] [address]      Dear Colleague,    Thank you for referring your patient, Kemi Mckoy, to the [facility] [place]. Please see a copy of my visit note below.    Kemi Mckoy is a 39 year old who is being evaluated via a billable video visit.      How would you like to obtain your AVS? MyChart  If the video visit is dropped, the invitation should be resent by: Send to e-mail at: [email or web address]  Will anyone else be joining your video visit? No        Medical Weight Loss Initial Diet Evaluation  Assessment:  This patient was referred by Dr. Fermin for MNT as treatment for Obesity.     Kemi is presenting today for a new weight management nutrition consultation. Pt has had an initial appointment with Dr. Archuleta.    Weight loss medication:  zepbound .     Personal Goals: goal weight is 130lb, was this weight in 2019     Anthropometrics:    Pt's weight is 183 lbs 0 oz  Initial weight: 195lb  Weight change: down 12lb  BMI: Body mass index is 29.31 kg/m .   Ideal body weight: 59.9 kg (132 lb)  Adjusted ideal body weight: 71.3 kg (157 lb 3.2 oz)  Estimated RMR ([place] equation):  1365 kcals x 1.2 (sedentary) = 1638 kcals (for weight maintenance)    Recommended Protein Intake: 70-94 grams of protein/day- based on 1.2-1.6g/kg goal weight    Medical History:  Patient Active Problem List   Diagnosis     Acne vulgaris     Gastroesophageal reflux disease without esophagitis     GIOVANNI (generalized anxiety disorder)     Moderate episode of recurrent major depressive disorder (H)     Recurrent herpes labialis     Rosacea     Vitamin D deficiency     Interstitial cystitis     Class 1 obesity due to excess calories without serious comorbidity with body mass index (BMI) of 31.0 to 31.9 in adult     Hypercholesteremia      HbA1c:  No results found for: \"HGBA1C\"  Biggest struggle: eating late at night, " avoiding complex carbohydrates    Nutrition History:   Food allergies/intolerances/cultural or religous food customs: Yes face turns red with gluten  Weight loss history: weight gain in 2020, dx interstitial cystitis- Mediterranean Diet and high acid (nery, chocolate, tomatoes, etc.)  New dx of PCOS-thinning hair, acne, skin tag, fatigue, regular periods 90% of the time, ovarian pain during ovulation and cycle- 26 cycle  -had given up meat and eating fish only, having a hard time getting 100g protein  Vitamins/Mineral Supplementation: MVI, magnesium     Dietary Recall:  Breakfast: wake up around 8-9a- eating for the first time at 2pm  Lunch: 2pm- tuna on lettuce with celery or carrots  Dinner: 7pm- vegetable, rice, farro or potatoes, sometimes fish  Typical Snacks: rare- if snacking will choose popcorn, apples and peanut butter, cookie is vice  Overnight eating: No  Eating out: 2    Beverages: only water- 60-80oz     Exercise: gym 3 times per week, walking    Nutrition Diagnosis (PES statement):     Overweight related to excessive energy intake as evidence by inadequate protein, lack of activity and BMI of 29.31       Nutrition Intervention  Food and/or Nutrient Delivery   Placed emphasis on importance of developing a healthy meal routine, aiming for 3 meals a day and no snacks.  Discussed nutrition therapy for PCOS, adding in inositol, berberine and magnesium, focusing on mediterranean style way of eating, plant forward foods  Nutrition Education   Discussed with patient how to build a meal: the importance of including a lean/low fat protein at each meal, include a source of vegetables at a minimum of lunch and dinner and limiting carbohydrate intake  Educated on sources of lean protein, portion sizes, the amount of grams found in each source. Recommend patient to aim for 20-30g protein at each meal.  Educated on plant based protein  Discussed the importance of adequate hydration, with emphasis on drinking 64oz  of water or zero calorie beverages per day.  Nutrition Counseling   Encouraged importance of developing routine exercise for health benefits and weight loss.    Goals established by patient:   70-95g protein per day, focus on plant based proteins to help boost fiber  Start taking supplements for PCOS  Handouts provided:  PCOS keys to success  Plant based proteins    Assessment/Plan:    Pt will follow up in 1 month(s) with bariatrician and 2 month(s) with dietitian.       Video-Visit Details    Type of service:  Video Visit    Video Start Time (time video started): 11:30a    Video End Time (time video stopped): 12:00p    Originating Location (pt. Location): Home      Distant Location (provider location):  Off-site    Mode of Communication:  Video Conference via [facility]    Physician has received verbal consent for a Video Visit from the patient? Yes      Angelita Fung RD        Again, thank you for allowing me to participate in the care of your patient.        Sincerely,        Angelita Fung RD

## 2024-12-17 DIAGNOSIS — N30.10 INTERSTITIAL CYSTITIS: ICD-10-CM

## 2024-12-18 RX ORDER — CYCLOBENZAPRINE HCL 5 MG
5 TABLET ORAL 3 TIMES DAILY PRN
Qty: 60 TABLET | Refills: 1 | Status: SHIPPED | OUTPATIENT
Start: 2024-12-18

## 2024-12-26 DIAGNOSIS — N30.10 INTERSTITIAL CYSTITIS: ICD-10-CM

## 2024-12-26 RX ORDER — AMITRIPTYLINE HYDROCHLORIDE 10 MG/1
10 TABLET ORAL AT BEDTIME
Qty: 90 TABLET | Refills: 1 | Status: SHIPPED | OUTPATIENT
Start: 2024-12-26

## 2025-01-06 ENCOUNTER — OFFICE VISIT (OUTPATIENT)
Dept: FAMILY MEDICINE | Facility: CLINIC | Age: 40
End: 2025-01-06
Attending: FAMILY MEDICINE
Payer: COMMERCIAL

## 2025-01-06 VITALS
WEIGHT: 173.2 LBS | DIASTOLIC BLOOD PRESSURE: 84 MMHG | TEMPERATURE: 98.2 F | HEIGHT: 66 IN | SYSTOLIC BLOOD PRESSURE: 122 MMHG | HEART RATE: 64 BPM | BODY MASS INDEX: 27.83 KG/M2 | RESPIRATION RATE: 14 BRPM | OXYGEN SATURATION: 100 %

## 2025-01-06 DIAGNOSIS — E66.3 OVERWEIGHT WITH BODY MASS INDEX (BMI) OF 27 TO 27.9 IN ADULT: Primary | ICD-10-CM

## 2025-01-06 DIAGNOSIS — E78.00 HYPERCHOLESTEREMIA: ICD-10-CM

## 2025-01-06 PROCEDURE — G2211 COMPLEX E/M VISIT ADD ON: HCPCS | Performed by: FAMILY MEDICINE

## 2025-01-06 PROCEDURE — 99213 OFFICE O/P EST LOW 20 MIN: CPT | Performed by: FAMILY MEDICINE

## 2025-01-06 RX ORDER — TIRZEPATIDE 5 MG/.5ML
5 INJECTION, SOLUTION SUBCUTANEOUS
Qty: 2 ML | Refills: 2 | Status: SHIPPED | OUTPATIENT
Start: 2025-01-06

## 2025-01-06 ASSESSMENT — PATIENT HEALTH QUESTIONNAIRE - PHQ9
10. IF YOU CHECKED OFF ANY PROBLEMS, HOW DIFFICULT HAVE THESE PROBLEMS MADE IT FOR YOU TO DO YOUR WORK, TAKE CARE OF THINGS AT HOME, OR GET ALONG WITH OTHER PEOPLE: NOT DIFFICULT AT ALL
SUM OF ALL RESPONSES TO PHQ QUESTIONS 1-9: 1
SUM OF ALL RESPONSES TO PHQ QUESTIONS 1-9: 1

## 2025-01-06 NOTE — PROGRESS NOTES
"  Assessment & Plan   Problem List Items Addressed This Visit       Hypercholesteremia    Relevant Medications    ZEPBOUND 5 MG/0.5ML prefilled pen    Overweight with body mass index (BMI) of 27 to 27.9 in adult - Primary     39 year old year old female in clinic today to discuss treatment of the following conditions through diet and lifestyle modification and weight loss:  1. Overweight with body mass index (BMI) of 27 to 27.9 in adult    2. Hypercholesteremia      The patient's weight loss result since the last visit was successful based on weight.  She is struggling with fairly severe side effects from zepbound.  We have to check to see if her new insurance covers the medication. If covered, we will reduce the dose to 5 mg.  She would consider paying for the medication out of pocket.      BMI: Body mass index is 27.74 kg/m .  Ideal body weight: 59.9 kg (132 lb)  Adjusted ideal body weight: 67.4 kg (148 lb 7.7 oz)    Current therapies:    Medications: YES tirzapatide    - Starting weight for GLP1 receptor agonist: 195   - Total weight loss: 22 lbs (11.3%)    Nutritional goals/strategies: reviewed.   - caloric restriction: Yes   - time restriction: NO   - diet (macronutrient) framework: high protein/low carbohydrate     Movement:   - mixed modality    Follow-up: 3 months           Relevant Medications    ZEPBOUND 5 MG/0.5ML prefilled pen       BMI  Estimated body mass index is 27.74 kg/m  as calculated from the following:    Height as of this encounter: 1.683 m (5' 6.25\").    Weight as of this encounter: 78.6 kg (173 lb 3.2 oz).   Weight management plan: Specific weight management program called Comprehensive Weight Management discussed    The longitudinal plan of care for the diagnosis(es)/condition(s) as documented were addressed during this visit. Due to the added complexity in care, I will continue to support Kemi in the subsequent management and with ongoing continuity of care.    Subjective   Kemi is a 39 " "year old, presenting for the following health issues:  weight loss follow up        1/6/2025     4:27 PM   Additional Questions   Roomed by ac   Accompanied by self     Patient presents for treatment of chronic, comorbid conditions using intensive therapeutic lifestyle interventions including nutrition, physical activity, and behavior management.   - new job.  Not sure about insurance coverage going forward   - successes: happy with weight loss.     - struggles: Getting enough protein.  \"Back to eating chicken.\"   Side effects for about 48 hours.  \"Fake flu.\" She has to travel.    - movement: PT.  Doing okay   - tracking/journaling: ad denzel.  Protein rich.     - following nutritional plan: yes.  Deviations from plan: adequate protein   - hunger: Stable.    - medication benefits: suppresses appetite. Side effects: ~48 hours of achiness.  She generally feels \"vapid.\"        History of Present Illness       Reason for visit:  Weight Loss Obesity    She eats 2-3 servings of fruits and vegetables daily.She consumes 0 sweetened beverage(s) daily.She exercises with enough effort to increase her heart rate 30 to 60 minutes per day.  She exercises with enough effort to increase her heart rate 3 or less days per week.   She is taking medications regularly.           Objective    /84 (BP Location: Left arm, Patient Position: Sitting, Cuff Size: Adult Large)   Pulse 64   Temp 98.2  F (36.8  C) (Oral)   Resp 14   Ht 1.683 m (5' 6.25\")   Wt 78.6 kg (173 lb 3.2 oz)   LMP 12/18/2024   SpO2 100%   BMI 27.74 kg/m    Body mass index is 27.74 kg/m .  Physical Exam  Nursing note reviewed.   Constitutional:       General: She is not in acute distress.     Appearance: Normal appearance. She is not ill-appearing.   HENT:      Head: Normocephalic and atraumatic.   Eyes:      Extraocular Movements: Extraocular movements intact.      Conjunctiva/sclera: Conjunctivae normal.   Pulmonary:      Effort: Pulmonary effort is normal. "   Neurological:      Mental Status: She is alert and oriented to person, place, and time.   Psychiatric:         Attention and Perception: Attention normal.         Mood and Affect: Mood normal.         Speech: Speech normal.         Thought Content: Thought content normal.               Signed Electronically by: Arnulfo Archuleta MD

## 2025-01-06 NOTE — ASSESSMENT & PLAN NOTE
39 year old year old female in clinic today to discuss treatment of the following conditions through diet and lifestyle modification and weight loss:  1. Overweight with body mass index (BMI) of 27 to 27.9 in adult    2. Hypercholesteremia      The patient's weight loss result since the last visit was successful based on weight.  She is struggling with fairly severe side effects from zepbound.  We have to check to see if her new insurance covers the medication. If covered, we will reduce the dose to 5 mg.  She would consider paying for the medication out of pocket.      BMI: Body mass index is 27.74 kg/m .  Ideal body weight: 59.9 kg (132 lb)  Adjusted ideal body weight: 67.4 kg (148 lb 7.7 oz)    Current therapies:    Medications: YES tirzapatide    - Starting weight for GLP1 receptor agonist: 195   - Total weight loss: 22 lbs (11.3%)    Nutritional goals/strategies: reviewed.   - caloric restriction: Yes   - time restriction: NO   - diet (macronutrient) framework: high protein/low carbohydrate     Movement:   - mixed modality    Follow-up: 3 months

## 2025-01-06 NOTE — PATIENT INSTRUCTIONS
"100+ grams of protein per day  800 grams of veggies/fruit day   Do not drink your calories    24 week program - $499    CGM -continuous glucose monitor: Prices vary.      - Freestyle zuly line generally $80-$100 (prescription required)   - [email or web address]://www.[employer]/ (OTC - from dexcom website)    Non-injectable options for weight loss:  primarily have the effect of reducing caloric intake.    Binge eating disorder: Vyvanse (AKA lisdexamfetamine).  This medication reduces impulsivity leading to binge eating behaviors.    Excess appetite: Phentermine, phentermine/topiramate, diethylpropion.  Historically these medications are the work horse of medical weight loss.  High-dose phentermine has a high risk of weight regain.  Phentermine can cause irritability, dry mouth.  These medications are pregnancy category X and we generally require patients to actively avoid pregnancy.  Principal benefit of phentermine or phentermine/topiramate is cost and ease of entry.    Reward pathway: Contrave (bupropion/naltrexone).  Both medicines work to blunt cravings.  They work by making it easier to adhere to overall lifestyle factors.    GLP-1 receptor agonist drug class side effects:    Bureaucracy: Cost, possibility of weight regain?    Not generally covered by Medicare (exception for some plans are for individuals with histories of heart disease).  The typical patient will likely regain weight after going off the medicine.  (Most patients who have gone off of the medicine have not gone off the medicine on his/her \"own terms\").  Think of these medicines in the 5 or 10-year time increments.    Delayed gastric emptying:  These are the side effects that most commonly result in individuals going off of these therapies.  Slowing of the digestive tract.  Food will stay in the stomach longer.  Most common side effect is nausea and queasiness which tends to improve.    Some individuals experience random throwing up.  In my opinion, the " medications not worth it if this is occurring.  Constipation.      Pregnancy concerns:  GLP-1 receptor agonists are stopped when a person is pregnant.  ACOG recommends to not be on these medications while trying to conceive.  They have not been shown to cause birth defects.  Theoretically these medications can result any decreased absorption of oral contraceptives.  There is a theoretical concern that OCPs may not be as effective for contraception, in particular after initiation of these medications and after dose adjustments.    Effect on mood:  I have had a couple of patients experience dramatic changes in level of anxiety and depression.  There are case reports in the literature.  Some individuals use food as a coping mechanism.  If this coping mechanism is no longer possible given the effect of the medicine, some individuals struggle.    Physical Fragility:  Weight loss due to loss of muscle vs burning of fat.  I am concerned that individuals who take these medications long-term are at risk for excessive loss of muscle (medical term: sarcopenia).  Low muscle mass in older individuals leads to injury and decreased overall function.   Strategies to mitigate loss of muscle include dietary practices focused on protein and focusing exercise activities on strength training with functional fitness goals.     Contraindications:  Personal or family history of medullary thyroid cancer, multiple endocrine neoplasm syndrome.  There are some reports that lifetime risk of thyroid cancer increases 2-3x.  This is a rare cancer to begin with.  Personal history of pancreatitis.    GLP1-ra options:    Wegovy (or Ozempic) aka semaglutide: cost per month retail ~$1400      Zepbound (or Mounjaro) aka tirzepatide: cost per month retail ~$1100 (there is an online coupon)      Cash Pay Options for continuing GLP1/GIP agonist in 2025:  Pay out of pocket for Wegovy or Zepbound pens (>$1000/month cash price without savings card)   Zepbound  cost with Zepbound savings card (link below to sign up for this): $650/month with card  https://www.enrollment.zepbound.365 docobites.com/enroll/checkEnrollment  Wegovy cost with Wegovy savings card (link below to sign up for this): $650/month with card   https://www.[employer]/coverage-and-savings/save-on-wegovy.html  Compounded semaglutide (same active ingredient as Wegovy) from [facility]   - $230/month for doses of 1mg or less   - $370/month for doses higher than 1mg  - this is an available option for as long as Wegovy is on FDA shortage list, Wegovy has been on the list for the last several months with no foreseeable end in sight as of now*  Zepbound Vials through [facility] pharmacy - vials only available in 2.5 mg and 5 mg doses  $399/month for 2.5mg vials  $549/month for 5mg vials   $5 per month for administrations supplies (syringe/needles, etc)

## 2025-01-10 ENCOUNTER — TELEPHONE (OUTPATIENT)
Dept: FAMILY MEDICINE | Facility: CLINIC | Age: 40
End: 2025-01-10
Payer: COMMERCIAL

## 2025-01-10 NOTE — TELEPHONE ENCOUNTER
Prior Authorization Request  Who s requesting:  Pharmacy  Pharmacy Name and Location: [place] [address]  Medication Name: ZEPBOUND 5 MG/0.5ML prefilled pen   Insurance Plan: [employer]  Insurance Member ID Number:  [id number]   CoverMyMeds Key:   Informed patient that prior authorizations can take up to 10 business days for response:   NA  Okay to leave a detailed message: N/A

## 2025-01-13 ENCOUNTER — TELEPHONE (OUTPATIENT)
Dept: FAMILY MEDICINE | Facility: CLINIC | Age: 40
End: 2025-01-13
Payer: COMMERCIAL

## 2025-01-13 NOTE — TELEPHONE ENCOUNTER
Prior Authorization Not Needed per Insurance - on seconday ins    Medication: ZEPBOUND 5 MG/0.5ML SC SOAJ  Insurance Company: [employer] Non-Specialty PA's - Phone [phone] Fax [phone]  Expected CoPay: $    Pharmacy Filling the Rx: [id number] - [place], [place] - [address] [place] [address] N AT [facility] [address]  Pharmacy Notified: yes  Patient Notified: instructed pharmacy to notify patient when ready

## 2025-01-13 NOTE — TELEPHONE ENCOUNTER
Prior Authorization Not Needed per Insurance    Medication: ZEPBOUND 5 MG/0.5ML SC SOAJ  Insurance Company: [employer] PMAP - Phone [phone] Fax [phone]  Expected CoPay: $    Pharmacy Filling the Rx:    Pharmacy Notified: yes  Patient Notified: instructed pharmacy to notify patient when ready

## 2025-01-13 NOTE — TELEPHONE ENCOUNTER
PRIOR AUTHORIZATION DENIED    Medication: ZEPBOUND 5 MG/0.5ML SC SOAJ  Insurance Company: [employer] Non-Specialty PA's - Phone [phone] Fax [phone]  Denial Date:  1/13/2025  Denial Reason(s): DRUG NOT COVERED  Appeal Information: NA  Patient Notified: MICAELA

## 2025-01-21 ENCOUNTER — MYC MEDICAL ADVICE (OUTPATIENT)
Dept: FAMILY MEDICINE | Facility: CLINIC | Age: 40
End: 2025-01-21
Payer: COMMERCIAL

## 2025-01-21 DIAGNOSIS — E66.3 OVERWEIGHT WITH BODY MASS INDEX (BMI) OF 27 TO 27.9 IN ADULT: Primary | ICD-10-CM

## 2025-01-21 DIAGNOSIS — E78.00 HYPERCHOLESTEREMIA: ICD-10-CM

## 2025-01-22 RX ORDER — TIRZEPATIDE 5 MG/.5ML
5 INJECTION, SOLUTION SUBCUTANEOUS WEEKLY
Qty: 2 ML | Refills: 5 | Status: SHIPPED | OUTPATIENT
Start: 2025-01-22

## 2025-01-31 ENCOUNTER — TELEPHONE (OUTPATIENT)
Dept: FAMILY MEDICINE | Facility: CLINIC | Age: 40
End: 2025-01-31
Payer: COMMERCIAL

## 2025-01-31 NOTE — TELEPHONE ENCOUNTER
Prior Authorization Request  Who s requesting:  Patient  Pharmacy Name and Location: [place] [address]  Medication Name: Tirzepatide (Mounjaro) 5 MG/0.5ML SOAJ   Insurance Plan: [employer]  Insurance Member ID Number:  [id number]   CoverMyMeds Key:   Informed patient that prior authorizations can take up to 10 business days for response:   Yes  Okay to leave a detailed message: Yes

## 2025-02-05 NOTE — TELEPHONE ENCOUNTER
Central Prior Authorization Team   Phone: [phone]    PA Initiation    Medication: Tirzepatide (Mounjaro) 5 MG/0.5ML SOAJ   Insurance Company: [employer] Non-Specialty PA's - Phone [phone] Fax [phone]  Pharmacy Filling the Rx: [id number] - [place], [place] - [address] [place] [address] AT [facility] & [address]  Filling Pharmacy Phone: [phone]  Filling Pharmacy Fax:    Start Date: 2/5/2025

## 2025-02-13 NOTE — TELEPHONE ENCOUNTER
Called insurance and Rep Vesta HOFFMAN stated that PA has been Denied and Denial Rational Diagnosis Insulin resistance [E88.819] do not meet criteria as medication is covered with a diagnosis Type 2 Diabetes Mellitus. Rep Gutierrez stated that Denial Letter was mailed out to patient and Denial Letter was sent to provider via fax. Advised Rep that Denial Letter was not received and requested Denial Letter be sent via fax.

## 2025-02-17 NOTE — TELEPHONE ENCOUNTER
PRIOR AUTHORIZATION DENIED    Medication: Tirzepatide (Mounjaro) 5 MG/0.5ML SOAJ -PA DENIED     Denial Date: 2/5/2025    Denial Rational:           Appeal Information:

## 2025-02-19 ENCOUNTER — MYC MEDICAL ADVICE (OUTPATIENT)
Dept: FAMILY MEDICINE | Facility: CLINIC | Age: 40
End: 2025-02-19
Payer: COMMERCIAL

## 2025-02-19 DIAGNOSIS — R11.0 NAUSEA: ICD-10-CM

## 2025-02-19 DIAGNOSIS — R11.15 CYCLIC VOMITING SYNDROME: ICD-10-CM

## 2025-02-19 DIAGNOSIS — N30.10 INTERSTITIAL CYSTITIS: ICD-10-CM

## 2025-02-20 ENCOUNTER — VIRTUAL VISIT (OUTPATIENT)
Dept: FAMILY MEDICINE | Facility: CLINIC | Age: 40
End: 2025-02-20
Payer: COMMERCIAL

## 2025-02-20 DIAGNOSIS — B00.1 HERPES LABIALIS: ICD-10-CM

## 2025-02-20 DIAGNOSIS — N30.10 INTERSTITIAL CYSTITIS: ICD-10-CM

## 2025-02-20 DIAGNOSIS — N92.6 IRREGULAR MENSTRUATION: Primary | ICD-10-CM

## 2025-02-20 DIAGNOSIS — F33.1 MODERATE EPISODE OF RECURRENT MAJOR DEPRESSIVE DISORDER (H): ICD-10-CM

## 2025-02-20 DIAGNOSIS — R11.0 NAUSEA: ICD-10-CM

## 2025-02-20 DIAGNOSIS — L20.82 FLEXURAL ECZEMA: ICD-10-CM

## 2025-02-20 RX ORDER — ONDANSETRON 4 MG/1
4 TABLET, ORALLY DISINTEGRATING ORAL EVERY 8 HOURS PRN
Qty: 20 TABLET | Refills: 2 | Status: CANCELLED | OUTPATIENT
Start: 2025-02-20

## 2025-02-20 RX ORDER — DULOXETIN HYDROCHLORIDE 30 MG/1
30 CAPSULE, DELAYED RELEASE ORAL DAILY
Qty: 90 CAPSULE | Refills: 2 | Status: SHIPPED | OUTPATIENT
Start: 2025-02-20

## 2025-02-20 RX ORDER — AMITRIPTYLINE HYDROCHLORIDE 10 MG/1
10 TABLET ORAL AT BEDTIME
Qty: 90 TABLET | Refills: 1 | Status: SHIPPED | OUTPATIENT
Start: 2025-02-20

## 2025-02-20 RX ORDER — HYDROXYZINE HYDROCHLORIDE 50 MG/1
50 TABLET, FILM COATED ORAL 2 TIMES DAILY PRN
Qty: 180 TABLET | Refills: 1 | Status: SHIPPED | OUTPATIENT
Start: 2025-02-20

## 2025-02-20 RX ORDER — VALACYCLOVIR HYDROCHLORIDE 1 G/1
2000 TABLET, FILM COATED ORAL 2 TIMES DAILY
Qty: 4 TABLET | Refills: 3 | Status: SHIPPED | OUTPATIENT
Start: 2025-02-20

## 2025-02-20 RX ORDER — CYCLOBENZAPRINE HCL 5 MG
5 TABLET ORAL 3 TIMES DAILY PRN
Qty: 60 TABLET | Refills: 1 | Status: SHIPPED | OUTPATIENT
Start: 2025-02-20

## 2025-02-20 RX ORDER — CLOBETASOL PROPIONATE 0.5 MG/G
CREAM TOPICAL 2 TIMES DAILY
Qty: 45 G | Refills: 1 | Status: SHIPPED | OUTPATIENT
Start: 2025-02-20

## 2025-02-20 RX ORDER — ONDANSETRON 4 MG/1
4 TABLET, ORALLY DISINTEGRATING ORAL EVERY 8 HOURS PRN
Qty: 20 TABLET | Refills: 2 | Status: SHIPPED | OUTPATIENT
Start: 2025-02-20

## 2025-02-20 RX ORDER — ESTRADIOL 0.1 MG/G
2 CREAM VAGINAL
Qty: 42.5 G | Refills: 3 | Status: SHIPPED | OUTPATIENT
Start: 2025-02-21

## 2025-02-20 RX ORDER — CIMETIDINE 300 MG
300 TABLET ORAL DAILY
Qty: 90 TABLET | Refills: 2 | Status: SHIPPED | OUTPATIENT
Start: 2025-02-20

## 2025-02-20 NOTE — PROGRESS NOTES
Kemi is a 39 year old who is being evaluated via a billable video visit.    How would you like to obtain your AVS? MyChart  If the video visit is dropped, the invitation should be resent by: Send to e-mail at: [email or web address]  Will anyone else be joining your video visit? No      Assessment & Plan   Assessment & Plan  Interstitial cystitis  Patient presents today to discuss consolidate medication given some recent issues with insurance as it pertains to her coverage from specialist care.  She has a history of interstitial  cystitis which was diagnosed while she was residing in [place].  They developed a treatment plan at that time which included nightly amitriptyline, off label Tagamet, muscle relaxants and hydroxyzine.  This regimen has done a relatively good job of controlling symptoms.  She was seen by another specialist provider in [place] who agreed with this treatment plan.  I discussed that all these medications seem reasonable and within my scope.  She may or may not return to urology, however she is willing to keep me in the loop in terms of treatment changes that are recommended.  She expresses some interest in reducing or removing medications, specifically the amitriptyline and we discussed we can always explore this at a future date.  Refills were sent to her pharmacy we will plan to reconnect at her annual visit later this year or sooner as needed.  Orders:     hydrOXYzine HCl (ATARAX) 50 MG tablet; Take 1 tablet (50 mg) by mouth 2 times daily as needed for itching.    Nausea  Refill on as needed Zofran sent today.  Orders:     ondansetron (ZOFRAN ODT) 4 MG ODT tab; Take 1 tablet (4 mg) by mouth every 8 hours as needed for nausea.    Herpes labialis  Patient utilizes burst therapy of Valtrex that very intermittently for outbreaks.  Refill sent today.  Orders:     valACYclovir (VALTREX) 1000 mg tablet; Take 2 tablets (2,000 mg) by mouth 2 times daily.    Flexural eczema  Well-controlled  with intermittent use of clobetasol cream twice daily for 7 to 14 days.  Refill sent today.  Orders:     clobetasol propionate (TEMOVATE) 0.05 % external cream; Apply topically 2 times daily. Apply to affected areas on thighs, knee, and elbows. Use for two weeks max per flare.    Irregular menstruation  Patient expresses an interest in receiving a formal diagnosis of PCOS in order for insurance to cover a GLP medication.  She had evidence of insulin resistance based on NICOLE-IR of 3.2. We discussed diagnostic criteria usually includes meeting 2 of 3 [place] criteria including evidence of hyperandrogenism, irregular menstrual cycles indicating irregular anovulation, and/or polycystic ovaries observed on pelvic ultrasound.  She does believe that she had a pelvic ultrasound done in [place] and will look into this.  We discussed this diagnosis can be made in primary care, but it is possible her insurance may request it to come from OB.  She will look into this and let me know if she has any additional needs or questions.       Moderate episode of recurrent major depressive disorder (H)  Stable on duloxetine 30mg daily in addition to amitriptyline. Denies any desire for medication changes today. PHQ-9 today is 1, correlating with her report of adequate control of symptoms.          The longitudinal plan of care for the diagnosis(es)/condition(s) as documented were addressed during this visit. Due to the added complexity in care, I will continue to support Kemi in the subsequent management and with ongoing continuity of care.    Subjective   Kemi is a 39 year old, presenting for the following health issues:  Medication Update and Referral (For PCOS Dx.)        2/20/2025     5:22 PM   Additional Questions   Roomed by sac   Accompanied by self         2/20/2025     5:22 PM   Patient Reported Additional Medications   Patient reports taking the following new medications no     Medication refills. No desire for change.  Hoping to send all to primary care given insurance coverage.  Interested in who would make a diagnosis of PCOS. Insulin resistance on labs. Needing GLP coverage and they are requesting a diagnosis. She has irregular cycles. Has had ovarian cysts removed.  Saw [facility] for interstitial cystitis. No changes were made.     History of Present Illness       Reason for visit:  Prescriptions    She eats 2-3 servings of fruits and vegetables daily.She consumes 0 sweetened beverage(s) daily.She exercises with enough effort to increase her heart rate 30 to 60 minutes per day.  She exercises with enough effort to increase her heart rate 3 or less days per week.   She is taking medications regularly.      Objective         Vitals:  No vitals were obtained today due to virtual visit.    Physical Exam   GENERAL: alert and no distress  EYES: Eyes grossly normal to inspection.  No discharge or erythema, or obvious scleral/conjunctival abnormalities.  RESP: No audible wheeze, cough, or visible cyanosis.    SKIN: Visible skin clear. No significant rash, abnormal pigmentation or lesions.  NEURO: Cranial nerves grossly intact.  Mentation and speech appropriate for age.  PSYCH: Appropriate affect, tone, and pace of words        Video-Visit Details    Type of service:  Video Visit   Originating Location (pt. Location): Home    Distant Location (provider location):  On-site  Platform used for Video Visit: Jodee  Signed Electronically by: ANJANA Moreland CNP

## 2025-02-20 NOTE — ASSESSMENT & PLAN NOTE
Patient presents today to discuss consolidate medication given some recent issues with insurance as it pertains to her coverage from specialist care.  She has a history of interstitial  cystitis which was diagnosed while she was residing in [place].  They developed a treatment plan at that time which included nightly amitriptyline, off label Tagamet, muscle relaxants and hydroxyzine.  This regimen has done a relatively good job of controlling symptoms.  She was seen by another specialist provider in [place] who agreed with this treatment plan.  I discussed that all these medications seem reasonable and within my scope.  She may or may not return to urology, however she is willing to keep me in the loop in terms of treatment changes that are recommended.  She expresses some interest in reducing or removing medications, specifically the amitriptyline and we discussed we can always explore this at a future date.  Refills were sent to her pharmacy we will plan to reconnect at her annual visit later this year or sooner as needed.  Orders:    hydrOXYzine HCl (ATARAX) 50 MG tablet; Take 1 tablet (50 mg) by mouth 2 times daily as needed for itching.

## 2025-02-20 NOTE — ASSESSMENT & PLAN NOTE
Stable on duloxetine 30mg daily in addition to amitriptyline. Denies any desire for medication changes today. PHQ-9 today is 1, correlating with her report of adequate control of symptoms.

## 2025-02-25 ENCOUNTER — MYC MEDICAL ADVICE (OUTPATIENT)
Dept: FAMILY MEDICINE | Facility: CLINIC | Age: 40
End: 2025-02-25
Payer: COMMERCIAL

## 2025-02-26 NOTE — CONFIDENTIAL NOTE
One of the insurance plans has coverage exclusion (telephone encounter dated 1/10). -I have not been successful appealing coverage exclusion    Does [facility] also [facility]    Mounjaro: Was denied in telephone encounter dated 1/31.  The patient does not have type 2 diabetes.

## 2025-03-11 ENCOUNTER — MEDICAL CORRESPONDENCE (OUTPATIENT)
Dept: HEALTH INFORMATION MANAGEMENT | Facility: CLINIC | Age: 40
End: 2025-03-11
Payer: COMMERCIAL

## 2025-03-11 ENCOUNTER — TRANSCRIBE ORDERS (OUTPATIENT)
Dept: OTHER | Age: 40
End: 2025-03-11

## 2025-03-11 ENCOUNTER — MYC MEDICAL ADVICE (OUTPATIENT)
Dept: FAMILY MEDICINE | Facility: CLINIC | Age: 40
End: 2025-03-11
Payer: COMMERCIAL

## 2025-03-11 DIAGNOSIS — R23.2 FLUSHING REACTION: ICD-10-CM

## 2025-03-11 DIAGNOSIS — Z91.09 ENVIRONMENTAL ALLERGIES: Primary | ICD-10-CM

## 2025-03-11 DIAGNOSIS — N30.10 INTERSTITIAL CYSTITIS: ICD-10-CM

## 2025-03-11 NOTE — TELEPHONE ENCOUNTER
Clinic RN: Please investigate patient's chart or contact patient if the information cannot be found because  Please call [employer]. Medications is shown confirmed by pharmacy.      SANTHOSH Cummings  [facility]  [phone]    [facility]   Monday  - Thursday 7 AM - 6 PM    Friday  7 AM - 5 PM     -Please call your clinic for assistance from a nurse after hours.

## 2025-03-12 RX ORDER — CYCLOBENZAPRINE HCL 5 MG
5 TABLET ORAL 2 TIMES DAILY PRN
Qty: 180 TABLET | Refills: 1 | OUTPATIENT
Start: 2025-03-12

## 2025-03-31 ENCOUNTER — MYC MEDICAL ADVICE (OUTPATIENT)
Dept: FAMILY MEDICINE | Facility: CLINIC | Age: 40
End: 2025-03-31
Payer: COMMERCIAL

## 2025-03-31 DIAGNOSIS — E66.3 OVERWEIGHT WITH BODY MASS INDEX (BMI) OF 27 TO 27.9 IN ADULT: ICD-10-CM

## 2025-03-31 DIAGNOSIS — E78.00 HYPERCHOLESTEREMIA: ICD-10-CM

## 2025-03-31 DIAGNOSIS — E88.819 INSULIN RESISTANCE: Primary | ICD-10-CM

## 2025-03-31 NOTE — TELEPHONE ENCOUNTER
Patient requesting Zepbound 7.5 mg dose.    Last OV for weight management 1/6/25.      Rx pended for review/approval, if appropriate.        Angi Banks RN  [facility]

## 2025-06-09 ENCOUNTER — PATIENT OUTREACH (OUTPATIENT)
Dept: CARE COORDINATION | Facility: CLINIC | Age: 40
End: 2025-06-09
Payer: COMMERCIAL

## 2025-07-21 ENCOUNTER — TELEPHONE (OUTPATIENT)
Dept: OPHTHALMOLOGY | Facility: CLINIC | Age: 40
End: 2025-07-21
Payer: COMMERCIAL

## 2025-07-21 NOTE — TELEPHONE ENCOUNTER
Patient confirmed scheduled appointment:  Date: 8/14/25  Time: 1:40PM  Visit type: NEW ADULT EYE  Provider:   Location: [facility] Location   Testing/imaging: CEE for Glasses and Contact Lenses  Additional notes: CEE with Dr. Whelan for eye glases and contact lens fitting. Last seen provider on 6/1/ 2021. -Appt Per PT       Patient will see appointment in [facility].

## 2025-07-24 ENCOUNTER — MYC REFILL (OUTPATIENT)
Dept: FAMILY MEDICINE | Facility: CLINIC | Age: 40
End: 2025-07-24
Payer: COMMERCIAL

## 2025-07-24 DIAGNOSIS — N30.10 INTERSTITIAL CYSTITIS: ICD-10-CM

## 2025-07-24 RX ORDER — AMITRIPTYLINE HYDROCHLORIDE 10 MG/1
10 TABLET ORAL AT BEDTIME
Qty: 90 TABLET | Refills: 2 | Status: SHIPPED | OUTPATIENT
Start: 2025-07-24

## 2025-07-24 RX ORDER — HYDROXYZINE HYDROCHLORIDE 50 MG/1
50 TABLET, FILM COATED ORAL 2 TIMES DAILY PRN
Qty: 180 TABLET | Refills: 1 | Status: SHIPPED | OUTPATIENT
Start: 2025-07-24

## 2025-07-24 RX ORDER — CYCLOBENZAPRINE HCL 5 MG
5 TABLET ORAL 2 TIMES DAILY PRN
Qty: 180 TABLET | Refills: 1 | Status: SHIPPED | OUTPATIENT
Start: 2025-07-24

## 2025-07-30 ENCOUNTER — PATIENT OUTREACH (OUTPATIENT)
Dept: CARE COORDINATION | Facility: CLINIC | Age: 40
End: 2025-07-30
Payer: COMMERCIAL

## 2025-08-13 ENCOUNTER — PATIENT OUTREACH (OUTPATIENT)
Dept: CARE COORDINATION | Facility: CLINIC | Age: 40
End: 2025-08-13
Payer: COMMERCIAL

## 2025-08-14 ENCOUNTER — OFFICE VISIT (OUTPATIENT)
Dept: OPHTHALMOLOGY | Facility: CLINIC | Age: 40
End: 2025-08-14
Payer: COMMERCIAL

## 2025-08-14 DIAGNOSIS — H52.13 MYOPIA OF BOTH EYES: Primary | ICD-10-CM

## 2025-08-14 DIAGNOSIS — H40.003 GLAUCOMA SUSPECT OF BOTH EYES: ICD-10-CM

## 2025-08-14 ASSESSMENT — REFRACTION_WEARINGRX
OD_SPHERE: -4.25
OS_AXIS: 093
OS_CYLINDER: +1.75
OD_AXIS: 091
SPECS_TYPE: SVL
OD_CYLINDER: +1.75
OS_SPHERE: -4.00

## 2025-08-14 ASSESSMENT — SLIT LAMP EXAM - LIDS
COMMENTS: NORMAL
COMMENTS: NORMAL

## 2025-08-14 ASSESSMENT — REFRACTION_MANIFEST
OS_SPHERE: -4.00
OD_CYLINDER: +1.75
OS_CYLINDER: +1.75
OS_AXIS: 099
OD_AXIS: 096
OD_SPHERE: -4.25

## 2025-08-14 ASSESSMENT — REFRACTION_CURRENTRX
OS_BASECURVE: 85
OD_CYLINDER: -1.75
OD_SPHERE: -2.50
OS_AXIS: 180
OS_DIAMETER: 14.5
OD_DIAMETER: 14.5
OS_SPHERE: -2.00
OD_AXIS: 180
OS_CYLINDER: -1.75
OD_BASECURVE: 85

## 2025-08-14 ASSESSMENT — VISUAL ACUITY
OD_CC: 20/20
VA_OR_OD_CURRENT_RX: 20/20
VA_OR_OS_CURRENT_RX: 20/20-1
METHOD: SNELLEN - LINEAR
OS_CC+: -1
CORRECTION_TYPE: GLASSES
OS_CC: 20/20

## 2025-08-14 ASSESSMENT — CONF VISUAL FIELD
OS_SUPERIOR_NASAL_RESTRICTION: 0
OS_NORMAL: 1
METHOD: COUNTING FINGERS
OS_SUPERIOR_TEMPORAL_RESTRICTION: 0
OD_NORMAL: 1
OD_INFERIOR_TEMPORAL_RESTRICTION: 0
OD_INFERIOR_NASAL_RESTRICTION: 0
OS_INFERIOR_TEMPORAL_RESTRICTION: 0
OD_SUPERIOR_TEMPORAL_RESTRICTION: 0
OS_INFERIOR_NASAL_RESTRICTION: 0
OD_SUPERIOR_NASAL_RESTRICTION: 0

## 2025-08-14 ASSESSMENT — TONOMETRY
IOP_METHOD: ICARE
OS_IOP_MMHG: 15
OD_IOP_MMHG: 18

## 2025-08-14 ASSESSMENT — EXTERNAL EXAM - RIGHT EYE: OD_EXAM: ROSACEA

## 2025-08-14 ASSESSMENT — EXTERNAL EXAM - LEFT EYE: OS_EXAM: ROSACEA

## 2025-08-14 ASSESSMENT — CUP TO DISC RATIO
OS_RATIO: 0.2
OD_RATIO: 0.5

## 2025-08-22 ENCOUNTER — MYC MEDICAL ADVICE (OUTPATIENT)
Dept: FAMILY MEDICINE | Facility: CLINIC | Age: 40
End: 2025-08-22
Payer: COMMERCIAL